# Patient Record
Sex: MALE | Race: OTHER | HISPANIC OR LATINO | Employment: PART TIME | URBAN - METROPOLITAN AREA
[De-identification: names, ages, dates, MRNs, and addresses within clinical notes are randomized per-mention and may not be internally consistent; named-entity substitution may affect disease eponyms.]

---

## 2023-09-12 ENCOUNTER — APPOINTMENT (OUTPATIENT)
Dept: LAB | Facility: LAB | Age: 23
End: 2023-09-12
Payer: MEDICAID

## 2023-09-12 ENCOUNTER — OFFICE VISIT (OUTPATIENT)
Dept: PRIMARY CARE | Facility: CLINIC | Age: 23
End: 2023-09-12
Payer: MEDICAID

## 2023-09-12 VITALS — DIASTOLIC BLOOD PRESSURE: 50 MMHG | SYSTOLIC BLOOD PRESSURE: 100 MMHG | WEIGHT: 211 LBS

## 2023-09-12 DIAGNOSIS — Z00.00 HEALTHCARE MAINTENANCE: Primary | ICD-10-CM

## 2023-09-12 PROCEDURE — 99385 PREV VISIT NEW AGE 18-39: CPT | Performed by: STUDENT IN AN ORGANIZED HEALTH CARE EDUCATION/TRAINING PROGRAM

## 2023-09-12 PROCEDURE — 1036F TOBACCO NON-USER: CPT | Performed by: STUDENT IN AN ORGANIZED HEALTH CARE EDUCATION/TRAINING PROGRAM

## 2023-09-12 RX ORDER — LAMOTRIGINE 150 MG/1
TABLET ORAL
COMMUNITY
Start: 2023-06-30 | End: 2023-09-12 | Stop reason: ALTCHOICE

## 2023-09-12 RX ORDER — ARIPIPRAZOLE 5 MG/1
5 TABLET ORAL DAILY
COMMUNITY
Start: 2023-02-24 | End: 2023-09-12 | Stop reason: ALTCHOICE

## 2023-09-12 RX ORDER — BUPROPION HYDROCHLORIDE 300 MG/1
TABLET ORAL
COMMUNITY
Start: 2023-08-09 | End: 2024-02-14 | Stop reason: SDUPTHER

## 2023-09-12 RX ORDER — ARIPIPRAZOLE 2 MG/1
TABLET ORAL
COMMUNITY
Start: 2023-02-13 | End: 2023-09-12 | Stop reason: ALTCHOICE

## 2023-09-12 RX ORDER — LAMOTRIGINE 100 MG/1
1 TABLET ORAL DAILY
COMMUNITY
Start: 2023-02-13 | End: 2023-09-12 | Stop reason: ALTCHOICE

## 2023-09-12 RX ORDER — LAMOTRIGINE 25 MG/1
TABLET ORAL
COMMUNITY
Start: 2023-08-24 | End: 2023-09-12 | Stop reason: ALTCHOICE

## 2023-09-12 RX ORDER — ESCITALOPRAM OXALATE 10 MG/1
TABLET ORAL
COMMUNITY
Start: 2023-08-24 | End: 2024-01-17 | Stop reason: SDUPTHER

## 2023-09-12 RX ORDER — BUPROPION HYDROCHLORIDE 150 MG/1
TABLET ORAL
COMMUNITY
Start: 2023-06-30 | End: 2023-11-21 | Stop reason: ALTCHOICE

## 2023-09-12 RX ORDER — PANTOPRAZOLE SODIUM 40 MG/1
TABLET, DELAYED RELEASE ORAL
COMMUNITY
Start: 2023-08-12 | End: 2023-11-13 | Stop reason: SDUPTHER

## 2023-09-12 NOTE — PROGRESS NOTES
Subjective   Luciano Tejeda is a 23 y.o. male who presents for [facility].  HPI  Patient presents to [facility] care with a primary care provider.  Patient states that he recently moved up in December 2022 from [place].  At that time, he was diagnosed with bipolar disorder in [place].  He now follows with psychiatry at [facility] and was diagnosed with depression.  Patient states that he is sleeping approximately 6 to 7 hours at night and wakes up feeling refreshed.  He occasionally goes back to sleep for approximately 1 hour and wakes up tired.  He is taking his medications and overall believes they are helping.  The mom accompanies the patient to the appointment and voices concerns regarding irritability.    Past medical history: Depression  Surgical history: pinky surgery  Family history: bipolar, Hodgkin lymphoma, alcohol abuse, hypertension, diabetes  Social history: alcohol use on weekends, no tobacco use, smokes marijuana 3 times weekly, works at [employer], planning to return to school, wants to be a vet, moved up from [place] 12/2022    Review of Systems  10 point review of system was performed and negative except as stated above.    Objective   /50   Wt 95.7 kg (211 lb)    Physical Exam  Physical Exam:  General:  Pleasant and cooperative. No apparent distress.  HEENT:  Normocephalic, atraumatic, mucus membranes moist.   Neck:  Trachea midline.  No JVD.    Chest:  Clear to auscultation bilaterally. No wheezes, rales, or rhonchi.  CV:  Regular rate and rhythm.  Positive S1/S2.   Abdomen: Bowel sounds present in all four quadrants, abdomen is soft, non-tender, non-distended.  Extremities:  No lower extremity edema or cyanosis.   Neurological:  AAOx3. No focal deficits.  Skin:  Warm and dry.    Assessment/Plan   Problem List Items Addressed This Visit    None    Depression vs bipolar   -Diagnosed with bipolar in [place] and Depression in [place]  -Currently on Wellbutrin 450  mg daily and Lexapro 10 mg daily  -Follows with Dr. Nicholson - [facility] psychiatry    Health maintenance:  -Routine labs prior to next visit    RTC in 1 year or sooner as needed.    This note was dictated by speech recognition. Minor errors in transcription may be present.

## 2023-10-05 ENCOUNTER — LAB (OUTPATIENT)
Dept: LAB | Facility: LAB | Age: 23
End: 2023-10-05
Payer: MEDICAID

## 2023-10-05 DIAGNOSIS — Z00.00 HEALTHCARE MAINTENANCE: ICD-10-CM

## 2023-10-05 LAB
ALBUMIN SERPL BCP-MCNC: 4.5 G/DL (ref 3.4–5)
ALP SERPL-CCNC: 60 U/L (ref 33–120)
ALT SERPL W P-5'-P-CCNC: 19 U/L (ref 10–52)
ANION GAP SERPL CALC-SCNC: 11 MMOL/L (ref 10–20)
AST SERPL W P-5'-P-CCNC: 15 U/L (ref 9–39)
BILIRUB SERPL-MCNC: 0.4 MG/DL (ref 0–1.2)
BUN SERPL-MCNC: 17 MG/DL (ref 6–23)
CALCIUM SERPL-MCNC: 9.7 MG/DL (ref 8.6–10.6)
CHLORIDE SERPL-SCNC: 103 MMOL/L (ref 98–107)
CHOLEST SERPL-MCNC: 150 MG/DL (ref 0–199)
CHOLESTEROL/HDL RATIO: 2.7
CO2 SERPL-SCNC: 30 MMOL/L (ref 21–32)
CREAT SERPL-MCNC: 0.84 MG/DL (ref 0.5–1.3)
ERYTHROCYTE [DISTWIDTH] IN BLOOD BY AUTOMATED COUNT: 11.6 % (ref 11.5–14.5)
GFR SERPL CREATININE-BSD FRML MDRD: >90 ML/MIN/1.73M*2
GLUCOSE SERPL-MCNC: 91 MG/DL (ref 74–99)
HCT VFR BLD AUTO: 48.1 % (ref 41–52)
HDLC SERPL-MCNC: 56.5 MG/DL
HGB BLD-MCNC: 15.9 G/DL (ref 13.5–17.5)
LDLC SERPL CALC-MCNC: 61 MG/DL (ref 110–150)
MCH RBC QN AUTO: 30.9 PG (ref 26–34)
MCHC RBC AUTO-ENTMCNC: 33.1 G/DL (ref 32–36)
MCV RBC AUTO: 93 FL (ref 80–100)
NON HDL CHOLESTEROL: 94 MG/DL (ref 0–149)
NRBC BLD-RTO: 0 /100 WBCS (ref 0–0)
PLATELET # BLD AUTO: 208 X10*3/UL (ref 150–450)
PMV BLD AUTO: 11.1 FL (ref 7.5–11.5)
POTASSIUM SERPL-SCNC: 4.1 MMOL/L (ref 3.5–5.3)
PROT SERPL-MCNC: 7.2 G/DL (ref 6.4–8.2)
RBC # BLD AUTO: 5.15 X10*6/UL (ref 4.5–5.9)
SODIUM SERPL-SCNC: 140 MMOL/L (ref 136–145)
TRIGL SERPL-MCNC: 163 MG/DL (ref 0–149)
TSH SERPL-ACNC: 0.81 MIU/L (ref 0.44–3.98)
VLDL: 33 MG/DL (ref 0–40)
WBC # BLD AUTO: 6.7 X10*3/UL (ref 4.4–11.3)

## 2023-10-05 PROCEDURE — 80061 LIPID PANEL: CPT

## 2023-10-05 PROCEDURE — 84443 ASSAY THYROID STIM HORMONE: CPT

## 2023-10-05 PROCEDURE — 36415 COLL VENOUS BLD VENIPUNCTURE: CPT

## 2023-10-05 PROCEDURE — 80053 COMPREHEN METABOLIC PANEL: CPT

## 2023-10-05 PROCEDURE — 85027 COMPLETE CBC AUTOMATED: CPT

## 2023-11-03 ENCOUNTER — LAB (OUTPATIENT)
Dept: LAB | Facility: LAB | Age: 23
End: 2023-11-03
Payer: MEDICAID

## 2023-11-03 ENCOUNTER — TELEPHONE (OUTPATIENT)
Dept: PRIMARY CARE | Facility: CLINIC | Age: 23
End: 2023-11-03

## 2023-11-03 ENCOUNTER — OFFICE VISIT (OUTPATIENT)
Dept: PRIMARY CARE | Facility: CLINIC | Age: 23
End: 2023-11-03
Payer: MEDICAID

## 2023-11-03 VITALS — SYSTOLIC BLOOD PRESSURE: 102 MMHG | WEIGHT: 227 LBS | DIASTOLIC BLOOD PRESSURE: 62 MMHG

## 2023-11-03 DIAGNOSIS — G47.00 INSOMNIA, UNSPECIFIED TYPE: ICD-10-CM

## 2023-11-03 DIAGNOSIS — G47.00 INSOMNIA, UNSPECIFIED TYPE: Primary | ICD-10-CM

## 2023-11-03 DIAGNOSIS — L74.9 SWEATING ABNORMALITY: ICD-10-CM

## 2023-11-03 LAB
25(OH)D3 SERPL-MCNC: 24 NG/ML (ref 30–100)
BASOPHILS # BLD AUTO: 0.03 X10*3/UL (ref 0–0.1)
BASOPHILS NFR BLD AUTO: 0.5 %
EOSINOPHIL # BLD AUTO: 0.14 X10*3/UL (ref 0–0.7)
EOSINOPHIL NFR BLD AUTO: 2.2 %
ERYTHROCYTE [DISTWIDTH] IN BLOOD BY AUTOMATED COUNT: 11.8 % (ref 11.5–14.5)
HCT VFR BLD AUTO: 49 % (ref 41–52)
HGB BLD-MCNC: 15.7 G/DL (ref 13.5–17.5)
IMM GRANULOCYTES # BLD AUTO: 0.02 X10*3/UL (ref 0–0.7)
IMM GRANULOCYTES NFR BLD AUTO: 0.3 % (ref 0–0.9)
IRON SATN MFR SERPL: 30 % (ref 25–45)
IRON SERPL-MCNC: 107 UG/DL (ref 35–150)
LYMPHOCYTES # BLD AUTO: 1.49 X10*3/UL (ref 1.2–4.8)
LYMPHOCYTES NFR BLD AUTO: 23 %
MCH RBC QN AUTO: 30.6 PG (ref 26–34)
MCHC RBC AUTO-ENTMCNC: 32 G/DL (ref 32–36)
MCV RBC AUTO: 96 FL (ref 80–100)
MONOCYTES # BLD AUTO: 0.73 X10*3/UL (ref 0.1–1)
MONOCYTES NFR BLD AUTO: 11.3 %
NEUTROPHILS # BLD AUTO: 4.07 X10*3/UL (ref 1.2–7.7)
NEUTROPHILS NFR BLD AUTO: 62.7 %
NRBC BLD-RTO: 0 /100 WBCS (ref 0–0)
PLATELET # BLD AUTO: 207 X10*3/UL (ref 150–450)
RBC # BLD AUTO: 5.13 X10*6/UL (ref 4.5–5.9)
TIBC SERPL-MCNC: 354 UG/DL (ref 240–445)
UIBC SERPL-MCNC: 247 UG/DL (ref 110–370)
VIT B12 SERPL-MCNC: 572 PG/ML (ref 211–911)
WBC # BLD AUTO: 6.5 X10*3/UL (ref 4.4–11.3)

## 2023-11-03 PROCEDURE — 82607 VITAMIN B-12: CPT

## 2023-11-03 PROCEDURE — 86038 ANTINUCLEAR ANTIBODIES: CPT

## 2023-11-03 PROCEDURE — 83550 IRON BINDING TEST: CPT

## 2023-11-03 PROCEDURE — 82306 VITAMIN D 25 HYDROXY: CPT

## 2023-11-03 PROCEDURE — 36415 COLL VENOUS BLD VENIPUNCTURE: CPT

## 2023-11-03 PROCEDURE — 99213 OFFICE O/P EST LOW 20 MIN: CPT | Performed by: STUDENT IN AN ORGANIZED HEALTH CARE EDUCATION/TRAINING PROGRAM

## 2023-11-03 PROCEDURE — 1036F TOBACCO NON-USER: CPT | Performed by: STUDENT IN AN ORGANIZED HEALTH CARE EDUCATION/TRAINING PROGRAM

## 2023-11-03 PROCEDURE — 83540 ASSAY OF IRON: CPT

## 2023-11-03 PROCEDURE — 86225 DNA ANTIBODY NATIVE: CPT

## 2023-11-03 PROCEDURE — 85025 COMPLETE CBC W/AUTO DIFF WBC: CPT

## 2023-11-03 PROCEDURE — 86235 NUCLEAR ANTIGEN ANTIBODY: CPT

## 2023-11-03 NOTE — TELEPHONE ENCOUNTER
Feliciano saw Dr Barron today for insomnia, she referred him for a 2nd opinion for psychiatry, Mom wants to know if you have any names to recommend

## 2023-11-03 NOTE — PROGRESS NOTES
Subjective   Patient ID: Luciano Tejeda is a 23 y.o. male who presents for Night Sweats (1xweek).  HPI  Feliciano presents for trouble sleeping. He reports insomnia for the past couple of weeks. He tried taking melatonin initially, with some improvement, but then he started developing sweating during the night. No recent headaches, energy levels have been low, not typically getting restful sleep. He also reports getting episodes of feeling warm during the day. Patient with a history of cluster Headaches in high school. No recent headaches. He has been having more loose stools, especially after eating spicy foods. Denies CP, SOB, N/V or any other complaints. He has been gaining weight recently, less active physically. He feels frustrated by agitation and irritability that he feels he cannot control at times.    Review of Systems  12-point ROS was reviewed and is negative, unless otherwise noted in HPI    Objective   Vitals:    11/03/23 1246   BP: 102/62      Physical Exam  GEN: alert, conversant, NAD  HEENT: PERRL, EOMI, MMM  NECK: supple, no LAD appreciated  CHEST: CTAB  CV: S1, S2, RRR, no murmurs appreciated  ABD: soft, NT, ND  EXT: no significant LE edema  SKIN: warm, dry    Assessment/Plan   #insomnia  #sweating  #irritability  #depression vs bipolar  Plan:  - Continue melatonin nightly, advised good sleep hygiene practices, keeping bedroom cool  - Check CBC w/ diff, MERRICK, iron studies, B12, vit D levels  - Referral to psychiatry for second opinion on medication management, currently following with [facility] psychiatry    RTC as scheduled, sooner PRN     Jj Barron DO

## 2023-11-06 LAB
ANA PATTERN: ABNORMAL
ANA SER QL HEP2 SUBST: POSITIVE
ANA TITR SER IF: ABNORMAL {TITER}
CENTROMERE B AB SER-ACNC: <0.2 AI
CHROMATIN AB SERPL-ACNC: <0.2 AI
DSDNA AB SER-ACNC: 1 IU/ML
ENA JO1 AB SER QL IA: <0.2 AI
ENA RNP AB SER IA-ACNC: <0.2 AI
ENA SCL70 AB SER QL IA: <0.2 AI
ENA SM AB SER IA-ACNC: <0.2 AI
ENA SM+RNP AB SER QL IA: <0.2 AI
ENA SS-A AB SER IA-ACNC: <0.2 AI
ENA SS-B AB SER IA-ACNC: <0.2 AI
RIBOSOMAL P AB SER-ACNC: <0.2 AI

## 2023-11-13 DIAGNOSIS — K21.9 GASTROESOPHAGEAL REFLUX DISEASE, UNSPECIFIED WHETHER ESOPHAGITIS PRESENT: Primary | ICD-10-CM

## 2023-11-13 RX ORDER — PANTOPRAZOLE SODIUM 40 MG/1
40 TABLET, DELAYED RELEASE ORAL
Qty: 30 TABLET | Refills: 5 | Status: SHIPPED | OUTPATIENT
Start: 2023-11-13 | End: 2024-05-23

## 2023-11-15 ENCOUNTER — OFFICE VISIT (OUTPATIENT)
Dept: BEHAVIORAL HEALTH | Facility: CLINIC | Age: 23
End: 2023-11-15
Payer: MEDICAID

## 2023-11-15 DIAGNOSIS — F19.10 SUBSTANCE ABUSE (MULTI): ICD-10-CM

## 2023-11-15 DIAGNOSIS — F31.81 BIPOLAR 2 DISORDER (MULTI): ICD-10-CM

## 2023-11-15 DIAGNOSIS — F43.23 ADJUSTMENT DISORDER WITH MIXED ANXIETY AND DEPRESSED MOOD: ICD-10-CM

## 2023-11-15 DIAGNOSIS — F41.9 ANXIETY: ICD-10-CM

## 2023-11-15 PROCEDURE — 99204 OFFICE O/P NEW MOD 45 MIN: CPT | Performed by: PSYCHIATRY & NEUROLOGY

## 2023-11-15 PROCEDURE — 1036F TOBACCO NON-USER: CPT | Performed by: PSYCHIATRY & NEUROLOGY
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
## 2023-11-21 ENCOUNTER — OFFICE VISIT (OUTPATIENT)
Dept: PRIMARY CARE | Facility: CLINIC | Age: 23
End: 2023-11-21
Payer: MEDICAID

## 2023-11-21 VITALS — WEIGHT: 223 LBS | SYSTOLIC BLOOD PRESSURE: 103 MMHG | DIASTOLIC BLOOD PRESSURE: 65 MMHG

## 2023-11-21 DIAGNOSIS — Z00.00 HEALTHCARE MAINTENANCE: Primary | ICD-10-CM

## 2023-11-21 PROCEDURE — 99395 PREV VISIT EST AGE 18-39: CPT | Performed by: STUDENT IN AN ORGANIZED HEALTH CARE EDUCATION/TRAINING PROGRAM

## 2023-11-21 PROCEDURE — 1036F TOBACCO NON-USER: CPT | Performed by: STUDENT IN AN ORGANIZED HEALTH CARE EDUCATION/TRAINING PROGRAM

## 2023-11-21 RX ORDER — ESCITALOPRAM OXALATE 20 MG/1
20 TABLET ORAL DAILY
COMMUNITY
Start: 2023-09-19 | End: 2023-11-21 | Stop reason: ALTCHOICE

## 2023-11-21 NOTE — PROGRESS NOTES
Annual exam    Subjective   Patient ID: Luciano Tejeda is a 23 y.o. male who presents for Annual Exam.    HPI     Presents for annual exam.  Reports has been doing well.  Has not had any issues at work.  Does note that he has occasionally been 5 to 7 minutes late which she has been working to improve.  Continues to do well in online classes, grades have been 90s or above.  Sleep has been doing better, did have 1 occurrence that stayed up later in the morning playing video games up but otherwise has been doing well.    Review of Systems    8 point review of systems is otherwise negative unless mentioned on HPI      Objective   /65   Wt 101 kg (223 lb)     Physical Exam    General: No acute distress  HEENT: EOMI  CV: Regular rate and rhythm, normal S1 and S2, no murmurs  Pulm: Clear to auscultation bilaterally, no wheezings, rales or rhonchi  Abd: Nondistended  MSK: 5/5 strength in all extremities  Skin: No rashes   Lymphatic: No lymphadenopathy      Assessment/Plan       Depression vs bipolar   -Diagnosed with bipolar in [place] and Depression in [place]  -Currently on Wellbutrin 450 mg daily and Lexapro 10 mg daily  -Follows with psychiatry      Health maintenance:  -Routine labs that were done recently reviewed     RTC in 1 year or sooner as needed.     This note was dictated by speech recognition. Minor errors in transcription may be present.

## 2023-11-22 ENCOUNTER — OFFICE VISIT (OUTPATIENT)
Dept: BEHAVIORAL HEALTH | Facility: CLINIC | Age: 23
End: 2023-11-22
Payer: MEDICAID

## 2023-11-22 DIAGNOSIS — F90.2 ATTENTION DEFICIT HYPERACTIVITY DISORDER (ADHD), COMBINED TYPE: ICD-10-CM

## 2023-11-22 DIAGNOSIS — F41.9 ANXIETY: ICD-10-CM

## 2023-11-22 DIAGNOSIS — F31.81 BIPOLAR 2 DISORDER (MULTI): ICD-10-CM

## 2023-11-22 DIAGNOSIS — F43.23 ADJUSTMENT DISORDER WITH MIXED ANXIETY AND DEPRESSED MOOD: ICD-10-CM

## 2023-11-22 DIAGNOSIS — F19.10 SUBSTANCE ABUSE (MULTI): ICD-10-CM

## 2023-11-22 PROCEDURE — 99214 OFFICE O/P EST MOD 30 MIN: CPT | Performed by: PSYCHIATRY & NEUROLOGY

## 2023-11-22 PROCEDURE — 1036F TOBACCO NON-USER: CPT | Performed by: PSYCHIATRY & NEUROLOGY

## 2023-11-22 PROCEDURE — 90836 PSYTX W PT W E/M 45 MIN: CPT | Performed by: PSYCHIATRY & NEUROLOGY

## 2023-11-22 NOTE — PROGRESS NOTES
Outpatient Psychiatry - Followup Medication+Psychotherapy    Location of service:  __x_ Office ___ A/V ___Telephone (3 factor ID completed)    Subjective   Luciano Tejeda, a 23 y.o. male, for followup visit.      Assessment/Plan   Diagnosis:    There is no problem list on file for this patient.      Diagnoses of Attention for Current Visit:  Bipolar 2 disorder  Anxiety  Substance abuse (alcohol, Marijuana)  R/o ADHD    Treatment Goals:  Specify outcomes written in observable, behavioral terms:   Anxiety: acquiring relapse prevention skills, reducing negative automatic thoughts, and reducing physical symptoms of anxiety  Depression: increasing motivation, reducing fatigue, and reducing negative automatic thoughts  Drug & Alcohol: Reducing excessive use of alcohol and marijuana  ADHD:  Further assessment for confirmation/psychological testing    Treatment Plan/Recommendations: Continue current medications for now; complete psychological testing before considering further neurologic evaluation; engage in psychotherapy; reduce to eliminate use of alcohol and marijuana; promote independence and consideration of adult life  Follow-up plan was discussed with patient.    Referral to:  [facility]    Review with patient: Treatment plan reviewed with the patient.    HPI:  Continuing review of history, past diagnoses and medications, responses.  His major issue is conflict with his mother who he feels is overly controlling and infantilizes him, but cannot afford to move out on his own (and continue drinking and buying weed when he wants)  Not considering alternative employment although had an approach by someone from [employer]; may pursue.  Tends to drink when not getting high or vice versa, and to drink on days he does not work.  Admits to using weed especially when shira.  Reviewed features of ADHD and positive experience with single dose of Adderal; also aypical response to Cocaine experimentation (not agitated or  "elevated, felt calm and in control)  Admits only taking Wellbutrin 300 mg, not 450 mg, and his meds for GERD most days; sometimes takes Lexapro 10 mg, not 20mg    Current Medications:    Current Outpatient Medications:     buPROPion XL (Wellbutrin XL) 300 mg 24 hr tablet, , Disp: , Rfl:     cetirizine (ZYRTEC) 10 mg capsule, Take by mouth., Disp: , Rfl:     escitalopram (Lexapro) 10 mg tablet, TAKE 1/2 TABLET BY MOUTH ONCE DAILY FOR 7 DAYS THEN INCREASE TO 1 FULL TABLET ONCE DAILY, Disp: , Rfl:     pantoprazole (ProtoNix) 40 mg EC tablet, Take 1 tablet (40 mg) by mouth once daily in the morning. Take before meals. Do not crush, chew, or split., Disp: 30 tablet, Rfl: 5    Interim Medical History and Review of Systems:  Noncontributory    Record Review: moderate    Psychiatric Review Of Systems:   Depressive Symptoms: NA  Manic Symptoms: Mood slightly elevated  Anxiety Symptoms: minimal  Disordered Eating Symptoms: NA  Inattentive Symptoms: mild  Hyperactive/Impulsive Symptoms: Frequent  Trauma Symptoms: NA  Conduct Issues: NA  Psychotic Symptoms: NA  Developmental Concerns: NA  Other Symptoms/Concerns:  Alcohol use; maybe 175 ml daily; has been more in past.  Marijuana use daily.  Combined use when not at work  Delirium/Altered Mental Status Symptoms: NA       Objective   Mental Status Exam:     Patient is a well nourished, well developed white male appearing to be approximately stated age  Appearance:   Well groomed, appropriately dressed   Attention:  Oriented X 3  Speech:         Form:  fluent, coherent, tangential;       Process:  mildly concrete; no abnormal associations       Content:  without hallucinations, delusions,  denies suicidal or homicidal ideation  Movements: No apparent gait disturbance or abnormal involuntary movements of face or trunk  Mood:  \"Good\"  Affect:  Euthymic  Cognition:   Grossly intact and appropriate to level of educational attainment  Judgement:   Fair  Insight:   " "Poor      Vitals:  There were no vitals filed for this visit.    Summary of Psychotherapy Component:  See HPI for details    Psychoeducation/Therapeutic Interventions during this visit:   _x__ Education re: symptoms, diagnosis and treatment options   __x_ Discussed physiologic factors that impact symptoms and stress   __x_ Discussed patterns of behavior and coping style that contribute to symptoms and presented alternative options   ___ Identified cognitive distortions and presented alternative options   __x_ Reinforced boundaries of control and responsibility in symptom management   __x_ Education re: impact of alcohol/drugs on symptoms and discussed treatment resources/rationale   ___ Explored, identified, reinforced strategies and resources for coping with symptoms    __x_ Discussed medication options, risks, benefits with patient and/or family/guardian     Psychotherapeutic approach:  _x__Individual   ___Couples   ___Group    __x_Supportive        (X)_Insight Oriented       ___Cognitive Behavioral       ___Other:    Other relevant content:  (see HPI for relevant details)    Time spent in therapy 45\"    Total time spent 60\"    Constantine Nj MD MPH  "

## 2023-12-06 ENCOUNTER — OFFICE VISIT (OUTPATIENT)
Dept: BEHAVIORAL HEALTH | Facility: CLINIC | Age: 23
End: 2023-12-06
Payer: MEDICAID

## 2023-12-06 DIAGNOSIS — F90.2 ATTENTION DEFICIT HYPERACTIVITY DISORDER (ADHD), COMBINED TYPE: ICD-10-CM

## 2023-12-06 DIAGNOSIS — F41.9 ANXIETY: ICD-10-CM

## 2023-12-06 DIAGNOSIS — F31.81 BIPOLAR 2 DISORDER (MULTI): ICD-10-CM

## 2023-12-06 DIAGNOSIS — F19.10 SUBSTANCE ABUSE (MULTI): ICD-10-CM

## 2023-12-06 PROCEDURE — 1036F TOBACCO NON-USER: CPT | Performed by: PSYCHIATRY & NEUROLOGY

## 2023-12-06 PROCEDURE — 99214 OFFICE O/P EST MOD 30 MIN: CPT | Performed by: PSYCHIATRY & NEUROLOGY

## 2023-12-06 RX ORDER — LITHIUM CARBONATE 300 MG
300 TABLET ORAL 2 TIMES DAILY
Qty: 60 TABLET | Refills: 11 | Status: SHIPPED | OUTPATIENT
Start: 2023-12-06 | End: 2023-12-27

## 2023-12-06 NOTE — PROGRESS NOTES
"Outpatient Psychiatry - Followup Medication+Psychotherapy    Location of service:  __X_ Office ___ A/V ___Telephone (3 factor ID completed)    Subjective   Luciano Tejeda, a 23 y.o. male, for followup visit.      Assessment/Plan   Diagnosis:    There is no problem list on file for this patient.      Diagnoses of Attention for Current Visit:  Problem List Items Addressed This Visit    None  Visit Diagnoses         Codes    Bipolar 2 disorder (CMS/[facility])     F31.81    Relevant Medications    lithium 300 mg tablet            Treatment Goals:  Specify outcomes written in observable, behavioral terms:   Anxiety: acquiring relapse prevention skills, reducing negative automatic thoughts, and reducing physical symptoms of anxiety  Drug & Alcohol: reducing/eliminating substance use  Bipolar II:  Reducing;/eliminating mood swings, reducing/eliminating spending sprees, gambling, irritability  R/O ADHD:  Improve concentration/focus, reduce impulsivity    Treatment Plan/Recommendations:   1. Discontinue Lexapro  2. Start Lithium carbonate 300 mg twice daily  3. Reassess in 2 weeks; based on response either increase or check blood level  4. Complete psych testing to evaluate for  diagnosis including r/o ADHD    Follow-up plan was discussed with patient.    Referral to:  Psych testing    Review with patient: Treatment plan reviewed with the patient.  Medication risks/benefit reviewed with the patient      HPI:    Patient returns for followup; Mom asks to join to report  1..Has not smoked MJ since starting with me but is still \"vaping something\"  2. Has been chronically late for work; if he misses again he will likely lose his job  3. Spends everything he earns even though not paying rent, utilities etc; purchased $200 worth of \"funko pops\" (anime character models?) for \"investment purposes\" but never sells, bought a new computer game for $50 he only played once, etc.  4. Stays up all night \"shira\" on line then has no energy next " "day  5. Seems like  he was better on Aripiprazole but kept telling doc he needed higher dose; at 12.5 mg he was \"a Zombie the next day\" so they  stopped it.  6. It was a \"Dr Manley\" in [place] who said he was possibly bipolar    In remaining 10\" patient expresses irritation with mother, \"See I told you she'd take up all our time,\" can't explaiin why even though he hated the computer game he plans to put more money into enhancements; clarifies what \"funko pops\" are and why he thinks good investment, etc.  Says his autistic brother is \"wise\" and has advised him when mother talks, just listen or don't listen but don't react, it only makes it worse.    Reviewed prior meds/responses:    No benefit he has seen from Lexapro (added just in August by [facility] doctor)  Never on Lithium, Depakote, Tegretol, other traditional mood stabilizers; was on Lamictal but didn't do much  3    Can't recall other antipsychotics beyond aripiprazole but \"might have\" been on Quetiapine, Olanzapine??    Current Medications:    Current Outpatient Medications:     buPROPion XL (Wellbutrin XL) 300 mg 24 hr tablet, , Disp: , Rfl:     cetirizine (ZYRTEC) 10 mg capsule, Take by mouth., Disp: , Rfl:     escitalopram (Lexapro) 10 mg tablet, TAKE 1/2 TABLET BY MOUTH ONCE DAILY FOR 7 DAYS THEN INCREASE TO 1 FULL TABLET ONCE DAILY, Disp: , Rfl:     lithium 300 mg tablet, Take 1 tablet (300 mg) by mouth 2 times a day., Disp: 60 tablet, Rfl: 11    pantoprazole (ProtoNix) 40 mg EC tablet, Take 1 tablet (40 mg) by mouth once daily in the morning. Take before meals. Do not crush, chew, or split., Disp: 30 tablet, Rfl: 5    Interim Medical History and Review of Systems:  Noncontributory    Record Review: moderate    Psychiatric Review Of Systems:   Depressive Symptoms: NA  Manic Symptoms: Irritability, spending sprees, gambling history  Anxiety Symptoms: Minimal  Disordered Eating Symptoms: NA  Inattentive Symptoms: Constantly changing focus before " "finishing tasks; can't concentrate on reading, other tasks (but can do shira for hours)   Hyperactive/Impulsive Symptoms: See abpve  Trauma Symptoms: NA  Conduct Issues: NA  Psychotic Symptoms: NA  Developmental Concerns: NA  Other Symptoms/Concerns:NA  Delirium/Altered Mental Status Symptoms: NA       Objective   Mental Status Exam:     Patient is a well nourished, well developed young adult white male appearing to be approximately stated age  Appearance:   Well groomed, appropriately dressed   Attention:  Oriented X 3  Speech:         Form:  fluent, coherent, mildly pressured, no evidence for formal thought disorder       Process:  abstract; but with tangential associations       Content:  without hallucinations, delusions,  denies suicidal or homicidal ideation  Movements: No apparent gait disturbance or abnormal involuntary movements of face or trunk  Mood:  \"Good\"  Affect:  Euthymic/mildly elevated  Cognition:   Grossly intact and appropriate to level of educational attainment  Judgement:   Good  Insight:   Good       Vitals:  There were no vitals filed for this visit.    Summary of Psychotherapy Component:      Psychoeducation/Therapeutic Interventions during this visit:   __X_ Education re: symptoms, diagnosis and treatment options   __x_ Discussed physiologic factors that impact symptoms and stress   __X_ Discussed patterns of behavior and coping style that contribute to symptoms and presented alternative options   __X_ Identified cognitive distortions and presented alternative options   __X_ Reinforced boundaries of control and responsibility in symptom management   __X_ Education re: impact of alcohol/drugs on symptoms and discussed treatment resources/rationale   ___ Explored, identified, reinforced strategies and resources for coping with symptoms    __X_ Discussed medication options, risks, benefits with patient and/or family/guardian     Psychotherapeutic " "approach:  __X_Individual   ___Couples   ___Group    __X_Supportive        ___Insight Oriented       ___Cognitive Behavioral       ___Other:    Other relevant content:  (see HPI for relevant details)    Time spent in therapy 20\"    Total time spent 30\"    Constantine Nj MD MPH  "

## 2023-12-21 ENCOUNTER — TELEMEDICINE (OUTPATIENT)
Dept: BEHAVIORAL HEALTH | Facility: CLINIC | Age: 23
End: 2023-12-21
Payer: MEDICAID

## 2023-12-21 DIAGNOSIS — F41.9 ANXIETY: ICD-10-CM

## 2023-12-21 DIAGNOSIS — F31.81 BIPOLAR 2 DISORDER (MULTI): ICD-10-CM

## 2023-12-21 DIAGNOSIS — F19.10 SUBSTANCE ABUSE (MULTI): ICD-10-CM

## 2023-12-21 PROCEDURE — 90791 PSYCH DIAGNOSTIC EVALUATION: CPT | Performed by: PSYCHOLOGIST
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
## 2023-12-26 ENCOUNTER — TELEMEDICINE (OUTPATIENT)
Dept: BEHAVIORAL HEALTH | Facility: CLINIC | Age: 23
End: 2023-12-26
Payer: MEDICAID

## 2023-12-26 DIAGNOSIS — F19.10 SUBSTANCE ABUSE (MULTI): ICD-10-CM

## 2023-12-26 DIAGNOSIS — F31.81 BIPOLAR 2 DISORDER (MULTI): ICD-10-CM

## 2023-12-26 DIAGNOSIS — F41.9 ANXIETY: ICD-10-CM

## 2023-12-26 PROBLEM — F90.2 ATTENTION DEFICIT HYPERACTIVITY DISORDER (ADHD), COMBINED TYPE: Status: RESOLVED | Noted: 2023-12-06 | Resolved: 2023-12-26

## 2023-12-26 PROCEDURE — 90837 PSYTX W PT 60 MINUTES: CPT | Performed by: PSYCHOLOGIST

## 2023-12-26 NOTE — PROGRESS NOTES
10:05am to 11:00am  Met with client today for his individual session via telehealth.  Client was given an ADHD specific assessment interview.  Upon completion of the assessment client's interview was scored and client does NOT have ADHD.  Client will be given his diagnosis during the next session.  It should be noted that client's old diagnosis of ADHD has been removed from client's chart.

## 2023-12-27 ENCOUNTER — OFFICE VISIT (OUTPATIENT)
Dept: BEHAVIORAL HEALTH | Facility: CLINIC | Age: 23
End: 2023-12-27
Payer: MEDICAID

## 2023-12-27 DIAGNOSIS — F31.81 BIPOLAR 2 DISORDER (MULTI): ICD-10-CM

## 2023-12-27 DIAGNOSIS — F43.23 ADJUSTMENT DISORDER WITH MIXED ANXIETY AND DEPRESSED MOOD: ICD-10-CM

## 2023-12-27 DIAGNOSIS — F41.9 ANXIETY: ICD-10-CM

## 2023-12-27 DIAGNOSIS — F19.10 SUBSTANCE ABUSE (MULTI): ICD-10-CM

## 2023-12-27 PROCEDURE — 99215 OFFICE O/P EST HI 40 MIN: CPT | Performed by: PSYCHIATRY & NEUROLOGY

## 2023-12-27 PROCEDURE — 99417 PROLNG OP E/M EACH 15 MIN: CPT | Performed by: PSYCHIATRY & NEUROLOGY

## 2023-12-27 PROCEDURE — 1036F TOBACCO NON-USER: CPT | Performed by: PSYCHIATRY & NEUROLOGY

## 2023-12-27 NOTE — PROGRESS NOTES
"Outpatient Psychiatry - Followup Medication+Psychotherapy     Location of service:  __X_ Office       ___ A/V            ___Telephone (3 factor ID completed)        Subjective   Luciano Tejeda, a 23 y.o. male, for followup visit.              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)     F31.81     Relevant Medications     lithium 300 mg tablet  - patient declined to take due to concerns about side effects            Treatment Goals:  Specify outcomes written in observable, behavioral terms:   Anxiety: acquiring relapse prevention skills, reducing negative automatic thoughts, and reducing physical symptoms of anxiety  Drug & Alcohol: reducing/eliminating substance use  Bipolar II:  Reducing;/eliminating mood swings, reducing/eliminating spending sprees, gambling, irritability  R/O ADHD:  Improve concentration/focus, reduce impulsivity - COMPLETED - no ADHD     Treatment Plan/Recommendations:   1. Discontinue Lithium (patient did not take and refuses  2. Resume Lexapro (patient did not discontinue)  3. Continue Bupropion  4. Return to clinic in 3 weeks  5. Dr. Lay to assist in finding appropriate therapist     Follow-up plan was discussed with patient.     Referral to:  Therapy (pending)     Review with patient: Treatment plan reviewed with the patient.  Medication risks/benefit reviewed with the patient        HPI:    Patient returns for followup; Mom  joins for second half to report  1..Has not smoked MJ ut is still \"vaping something\"  2. Continues to be late for work;  3. Spends everything he earns on shira and vaping  4. Stays up all night \"shira\" on line then has no energy next day  5. Was better on Aripiprazole but koverly sedated when dose increased above 10 mg  6. \"He " "needs to grow up.    Patient was assessed by Dr. Lay who finds NO SUPPORT for diagnosis of ADHD; therefore diagnosis removed from problem list.     Patient perhaps willing to try Depakote or Tegretal?  Might be willing to go back on Aripiprazole at lower dose        Current Medications:     Current Outpatient Medications:     buPROPion XL (Wellbutrin XL) 450 mg     escitalopram (Lexapro) 10 mg     pantoprazole (ProtoNix) 40 mg EC tablet, (prescribed by PCP     Interim Medical History and Review of Systems:  Noncontributory     Record Review: moderate     Psychiatric Review Of Systems:   Depressive Symptoms: NA  Manic Symptoms: Irritability, spending sprees, gambling history  Anxiety Symptoms: Minimal  Disordered Eating Symptoms: NA  Inattentive Symptoms: Constantly changing focus before finishing tasks; can't concentrate on reading, other tasks (but can do shira for hours)   Hyperactive/Impulsive Symptoms: See abpve  Trauma Symptoms: NA  Conduct Issues: NA  Psychotic Symptoms: NA  Developmental Concerns: NA  Other Symptoms/Concerns:NA  Delirium/Altered Mental Status Symptoms: NA        Objective   Mental Status Exam:  Patient is a well nourished, well developed young adult white male appearing to be approximately stated age  Appearance:   Well groomed, appropriately dressed   Attention:  Oriented X 3  Speech:         Form:  fluent, coherent, mildly pressured, no evidence for formal thought disorder       Process:  abstract; but with tangential associations       Content:  without hallucinations, delusions,  denies suicidal or homicidal ideation  Movements: No apparent gait disturbance or abnormal involuntary movements of face or trunk  Mood:  \"Good\"  Affect:  Euthymic/mildly elevated  Cognition:   Grossly intact and appropriate to level of educational attainment  Judgement:   Good  Insight:   Good         Vitals:  There were no vitals filed for this visit.     Summary of Psychotherapy Component:     " "  Psychoeducation/Therapeutic Interventions during this visit:   __X_ Education re: symptoms, diagnosis and treatment options   __x_ Discussed physiologic factors that impact symptoms and stress   __X_ Discussed patterns of behavior and coping style that contribute to symptoms and presented alternative options   __X_ Identified cognitive distortions and presented alternative options   __X_ Reinforced boundaries of control and responsibility in symptom management   __X_ Education re: impact of alcohol/drugs on symptoms and discussed treatment resources/rationale   ___ Explored, identified, reinforced strategies and resources for coping with symptoms    __X_ Discussed medication options, risks, benefits with patient and/or family/guardian      Psychotherapeutic approach:  __X_Individual                          __X_Supportive          :     Other relevant content:  (see HPI for relevant details)     Time spent in therapy 45\"     Total time spent 55\"     Constantine Nj MD MPH  "

## 2024-01-04 ENCOUNTER — APPOINTMENT (OUTPATIENT)
Dept: BEHAVIORAL HEALTH | Facility: CLINIC | Age: 24
End: 2024-01-04
Payer: MEDICAID

## 2024-01-17 ENCOUNTER — TELEPHONE (OUTPATIENT)
Dept: PRIMARY CARE | Facility: CLINIC | Age: 24
End: 2024-01-17

## 2024-01-17 ENCOUNTER — APPOINTMENT (OUTPATIENT)
Dept: BEHAVIORAL HEALTH | Facility: CLINIC | Age: 24
End: 2024-01-17
Payer: MEDICAID

## 2024-01-17 DIAGNOSIS — F31.81 BIPOLAR 2 DISORDER (MULTI): ICD-10-CM

## 2024-01-17 RX ORDER — ESCITALOPRAM OXALATE 10 MG/1
20 TABLET ORAL DAILY
Qty: 60 TABLET | Refills: 2 | Status: SHIPPED | OUTPATIENT
Start: 2024-01-17 | End: 2024-01-18

## 2024-01-17 NOTE — TELEPHONE ENCOUNTER
Needs emergency fill of lexapro until psychologist comes back from being ill.  (Not prescribed by you) ???

## 2024-01-18 ENCOUNTER — OFFICE VISIT (OUTPATIENT)
Dept: OPHTHALMOLOGY | Facility: CLINIC | Age: 24
End: 2024-01-18
Payer: MEDICAID

## 2024-01-18 DIAGNOSIS — H52.223 REGULAR ASTIGMATISM OF BOTH EYES: Primary | ICD-10-CM

## 2024-01-18 DIAGNOSIS — H52.03 HYPERMETROPIA OF BOTH EYES: ICD-10-CM

## 2024-01-18 PROCEDURE — 92015 DETERMINE REFRACTIVE STATE: CPT | Performed by: OPTOMETRIST

## 2024-01-18 PROCEDURE — 92004 COMPRE OPH EXAM NEW PT 1/>: CPT | Performed by: OPTOMETRIST

## 2024-01-18 RX ORDER — ESCITALOPRAM OXALATE 20 MG/1
TABLET ORAL
COMMUNITY
Start: 2023-10-19 | End: 2024-04-10 | Stop reason: WASHOUT

## 2024-01-18 ASSESSMENT — CUP TO DISC RATIO
OS_RATIO: 0.3
OD_RATIO: 0.3

## 2024-01-18 ASSESSMENT — ENCOUNTER SYMPTOMS
CONSTITUTIONAL NEGATIVE: 0
MUSCULOSKELETAL NEGATIVE: 0
PSYCHIATRIC NEGATIVE: 0
HEMATOLOGIC/LYMPHATIC NEGATIVE: 0
NEUROLOGICAL NEGATIVE: 0
ENDOCRINE NEGATIVE: 0
CARDIOVASCULAR NEGATIVE: 0
ALLERGIC/IMMUNOLOGIC NEGATIVE: 0
EYES NEGATIVE: 0
RESPIRATORY NEGATIVE: 0
GASTROINTESTINAL NEGATIVE: 0

## 2024-01-18 ASSESSMENT — CONF VISUAL FIELD
METHOD: COUNTING FINGERS
OD_INFERIOR_TEMPORAL_RESTRICTION: 0
OS_INFERIOR_TEMPORAL_RESTRICTION: 0
OS_SUPERIOR_TEMPORAL_RESTRICTION: 0
OD_SUPERIOR_TEMPORAL_RESTRICTION: 0
OD_SUPERIOR_NASAL_RESTRICTION: 0
OD_NORMAL: 1
OS_NORMAL: 1
OD_INFERIOR_NASAL_RESTRICTION: 0
OS_INFERIOR_NASAL_RESTRICTION: 0
OS_SUPERIOR_NASAL_RESTRICTION: 0

## 2024-01-18 ASSESSMENT — REFRACTION
OD_SPHERE: +0.25
OS_AXIS: 061
OS_CYLINDER: -0.75
OD_CYLINDER: -1.00
OS_SPHERE: +0.50
OD_AXIS: 093

## 2024-01-18 ASSESSMENT — REFRACTION_MANIFEST
OS_AXIS: 060
OD_SPHERE: PLANO
OS_CYLINDER: -0.25
OD_AXIS: 090
OS_SPHERE: -0.25
OD_CYLINDER: -1.00

## 2024-01-18 ASSESSMENT — EXTERNAL EXAM - LEFT EYE: OS_EXAM: NORMAL

## 2024-01-18 ASSESSMENT — TONOMETRY
OD_IOP_MMHG: 13
IOP_METHOD: GOLDMANN APPLANATION
OS_IOP_MMHG: 14

## 2024-01-18 ASSESSMENT — VISUAL ACUITY
METHOD: SNELLEN - LINEAR
OD_CC: 20/20
OS_CC: 20/20

## 2024-01-18 ASSESSMENT — REFRACTION_WEARINGRX
OD_AXIS: 093
OD_SPHERE: +0.25
OD_CYLINDER: -1.00
OS_AXIS: 061
OS_CYLINDER: -0.25
OS_SPHERE: +0.25

## 2024-01-18 ASSESSMENT — EXTERNAL EXAM - RIGHT EYE: OD_EXAM: NORMAL

## 2024-01-18 ASSESSMENT — SLIT LAMP EXAM - LIDS
COMMENTS: NORMAL
COMMENTS: NORMAL

## 2024-01-18 NOTE — PROGRESS NOTES
Assessment/Plan   Diagnoses and all orders for this visit:  Regular astigmatism of both eyes  Hypermetropia of both eyes  New spec rx released today per patient request. Ocular health wnl for age OU. Monitor 1 year or sooner prn. Refraction billed today. Discussed ok to wear glasses prn, but recommended to wear for driving.

## 2024-01-24 ENCOUNTER — APPOINTMENT (OUTPATIENT)
Dept: BEHAVIORAL HEALTH | Facility: CLINIC | Age: 24
End: 2024-01-24
Payer: MEDICAID

## 2024-01-31 ENCOUNTER — OFFICE VISIT (OUTPATIENT)
Dept: BEHAVIORAL HEALTH | Facility: CLINIC | Age: 24
End: 2024-01-31
Payer: MEDICAID

## 2024-01-31 DIAGNOSIS — F43.23 ADJUSTMENT DISORDER WITH MIXED ANXIETY AND DEPRESSED MOOD: ICD-10-CM

## 2024-01-31 DIAGNOSIS — F19.10 SUBSTANCE ABUSE (MULTI): ICD-10-CM

## 2024-01-31 DIAGNOSIS — F31.81 BIPOLAR 2 DISORDER (MULTI): Primary | ICD-10-CM

## 2024-01-31 PROCEDURE — 99215 OFFICE O/P EST HI 40 MIN: CPT | Performed by: PSYCHIATRY & NEUROLOGY

## 2024-01-31 PROCEDURE — 1036F TOBACCO NON-USER: CPT | Performed by: PSYCHIATRY & NEUROLOGY

## 2024-01-31 RX ORDER — DIVALPROEX SODIUM 250 MG/1
250 TABLET, DELAYED RELEASE ORAL 3 TIMES DAILY
Qty: 90 TABLET | Refills: 0 | Status: SHIPPED | OUTPATIENT
Start: 2024-01-31 | End: 2024-02-14 | Stop reason: SINTOL

## 2024-01-31 NOTE — PROGRESS NOTES
"Outpatient Psychiatry - Followup Medication+Psychotherapy     Location of service:  __X_ Office       ___ A/V            ___Telephone (3 factor ID completed)        Subjective   Luciano Tejeda, a 23 y.o. male, for followup visit.              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             Codes     Bipolar 2 disorder (CMS/[facility])     F31.81     Relevant Medications     Depakote 250 mg twice daily (prescribed today)  Reduce Bupropion from 450 mg to 300 mg daily  Continue Lexapro 20 mg daily            Treatment Goals:    Anxiety: acquiring relapse prevention skills, reducing negative automatic thoughts, and reducing physical symptoms of anxiety  Drug & Alcohol: reducing/eliminating substance use  Bipolar II:  Reducing;/eliminating mood swings, reducing/eliminating spending sprees, gambling, irritability  R/O ADHD:  Improve concentration/focus, reduce impulsivity - COMPLETED - no ADHD     Treatment Plan/Recommendations:   1. Discontinue Lithium (patient did not take and refuses  2. Resume Lexapro (patient did not discontinue)  3. Continue Bupropion  4. Return to clinic in 2 weeks  5. Dr. Lay to assist in finding appropriate therapist     Follow-up plan was discussed with patient.     Referral to:  Therapy (pending)     Review with patient: Treatment plan reviewed with the patient.  Medication risks/benefit reviewed with the patient        HPI:    Patient returns for followup; Mom  joins for second half to report  1..He says he stopped smoking for 2 weeks but then had \"one joint\" and \"Mom went ballistic\"      (She implies he did not stop  2. \"what are you going to do with your life?\"  3. Planning to return to finish college degree (he says; she is skeptical  4. Still shira every night; reports starting to get sleepy at night " "but \"pushes through\"  5. Agrees to try Depakote and get off of the antidepressants; Mom still wants him to get Aripiprazole  6. \"He needs to have a functional MRI to find out what's wrong with his head\"  (I explaiined again that there are no indications for fMRI and Medicaid would not pay; I don't see a purpose at this time  7. Still drinking             Current Medications:     Current Outpatient Medications:     buPROPion XL (Wellbutrin XL) 450 mg     escitalopram (Lexapro) 10 mg (Mom says 20?)    pantoprazole (ProtoNix) 40 mg EC tablet, (prescribed by PCP      Interim Medical History and Review of Systems:  Noncontributory     Record Review: moderate     Psychiatric Review Of Systems:   Depressive Symptoms: NA  Manic Symptoms: Irritability, spending sprees, gambling history  Anxiety Symptoms: Minimal  Disordered Eating Symptoms: NA  Inattentive Symptoms: Constantly changing focus before finishing tasks; can't concentrate on reading, other tasks (but can do shira for hours)   Hyperactive/Impulsive Symptoms: See abpve  Trauma Symptoms: NA  Conduct Issues: NA  Psychotic Symptoms: NA  Developmental Concerns: NA  Other Symptoms/Concerns:NA  Delirium/Altered Mental Status Symptoms: NA        Objective   Mental Status Exam:  Patient is a well nourished, well developed young adult white male appearing to be approximately stated age  Appearance:   Well groomed, appropriately dressed   Attention:  Oriented X 3  Speech:         Form:  fluent, coherent, mildly pressured, no evidence for formal thought disorder       Process:  abstract; but with tangential associations       Content:  without hallucinations, delusions,  denies suicidal or homicidal ideation  Movements: No apparent gait disturbance or abnormal involuntary movements of face or trunk  Mood:  \"Good\"  Affect:  Euthymic/mildly elevated until Mom joins the conversation; then becomes confrontative, agitated, raises voice  Cognition:   Grossly intact and appropriate " "to level of educational attainment  Judgement:   Fair  Insight:   Good         Vitals:  There were no vitals filed for this visit.     Summary of Psychotherapy Component:       Psychoeducation/Therapeutic Interventions during this visit:   __X_ Education re: symptoms, diagnosis and treatment options   __x_ Discussed physiologic factors that impact symptoms and stress   __X_ Discussed patterns of behavior and coping style that contribute to symptoms and presented alternative options   __X_ Identified cognitive distortions and presented alternative options   __X_ Reinforced boundaries of control and responsibility in symptom management   __X_ Education re: impact of alcohol/drugs on symptoms and discussed treatment resources/rationale   ___ Explored, identified, reinforced strategies and resources for coping with symptoms    __X_ Discussed medication options, risks, benefits with patient and/or family/guardian      Psychotherapeutic approach:  __X_Individual                          __X_Supportive          :     Other relevant content:  (see HPI for relevant details)     Time spent in therapy 45\"     Total time spent 60\"     Constantine Nj MD MPH        Instructions        Will start Depakote 250 b.I.d. and increase slowly;  Decrease Bupropion from 450 to 300 and decrease slowly  Continue Escitalopram at current dose (10 or 20??)  Followup in 2 weeks      "

## 2024-02-14 ENCOUNTER — OFFICE VISIT (OUTPATIENT)
Dept: BEHAVIORAL HEALTH | Facility: CLINIC | Age: 24
End: 2024-02-14
Payer: MEDICAID

## 2024-02-14 DIAGNOSIS — F90.2 ATTENTION DEFICIT HYPERACTIVITY DISORDER (ADHD), COMBINED TYPE: ICD-10-CM

## 2024-02-14 DIAGNOSIS — F31.81 BIPOLAR 2 DISORDER (MULTI): ICD-10-CM

## 2024-02-14 DIAGNOSIS — F19.10 SUBSTANCE ABUSE (MULTI): ICD-10-CM

## 2024-02-14 DIAGNOSIS — F43.23 ADJUSTMENT DISORDER WITH MIXED ANXIETY AND DEPRESSED MOOD: ICD-10-CM

## 2024-02-14 DIAGNOSIS — F41.9 ANXIETY: ICD-10-CM

## 2024-02-14 PROCEDURE — 99215 OFFICE O/P EST HI 40 MIN: CPT | Performed by: PSYCHIATRY & NEUROLOGY

## 2024-02-14 PROCEDURE — 99417 PROLNG OP E/M EACH 15 MIN: CPT | Performed by: PSYCHIATRY & NEUROLOGY

## 2024-02-14 PROCEDURE — 1036F TOBACCO NON-USER: CPT | Performed by: PSYCHIATRY & NEUROLOGY

## 2024-02-14 RX ORDER — ARIPIPRAZOLE 5 MG/1
5 TABLET ORAL DAILY
Qty: 30 TABLET | Refills: 2 | Status: SHIPPED | OUTPATIENT
Start: 2024-02-14 | End: 2024-03-13 | Stop reason: SDUPTHER

## 2024-02-14 RX ORDER — ARIPIPRAZOLE 5 MG/1
5 TABLET ORAL DAILY
Qty: 30 TABLET | Refills: 0 | Status: CANCELLED | OUTPATIENT
Start: 2024-02-14 | End: 2024-03-15

## 2024-02-14 RX ORDER — BUPROPION HYDROCHLORIDE 300 MG/1
300 TABLET ORAL EVERY MORNING
Qty: 90 TABLET | Refills: 1 | Status: SHIPPED | OUTPATIENT
Start: 2024-02-14 | End: 2024-08-12

## 2024-02-14 NOTE — PROGRESS NOTES
"Outpatient Psychiatry - Med Management Followup     Location of service:  __X_ Office ___ A/V ___Telephone (3 factor ID completed)      Subjective   Luciano Tejeda, a 23 y.o. male, for followup visit.      Assessment/Plan     Problem List Items Addressed This Visit             ICD-10-CM    Bipolar 2 disorder (CMS/HCC) F31.81   Anxiety  Substance Use Disorder (Cannabis, Alcohol)  Adjustment disorder with mixed emotions and conduct    Treatment Plan/Recommendations:   Discontinue Depakote (patient stopped due to side effects)  Start Aripiprazole 5 mg   Resume Lexapro (patient said he had discontinue but Mom says he has not)  Return to clinic in 4 weeks  Complete registration for [employer]    Follow-up plan was discussed with patient.    Referral to:  NA    Review with patient: Treatment plan reviewed with the patient.  Medication risks/benefit reviewed with the patient    HPI:    Feliciano returns for followup:  Mom seen briefly in [facility]  Started Depakote and thought it was helpful; thoughts clearer, ability to make decisions easier, felt more focus; stopped using cannabis  After about 5 days began having blurred vision which worsened over next few days so he discontinued  Continuing to game all night long most nights and work at [employer] 3 days a week; feeling bored, thinks that's why he vapes.  Only needs to complete one more semester of school for associate's degree; has started registration but still needs to order transcripts from school in [place] to be sent to [employer]  Agrees to go back on Aripiprazole, stick to 5 mg for now.  Has \"mostly stopped\" drinking and thinks he has definitely reduced use of cannabis    Current Medications:    Current Outpatient Medications:     buPROPion XL (Wellbutrin XL) 300 mg 24 hr tablet, , Disp: , Rfl:     cetirizine (ZYRTEC) 10 mg capsule, Take by mouth., Disp: , Rfl:     pantoprazole (ProtoNix) 40 mg EC tablet, Take 1 tablet (40 mg) by mouth once daily in the morning. Take " "before meals. Do not crush, chew, or split., Disp: 30 tablet, Rfl: 5    Interval Medical History:  No new changes    Psychiatric Review Of Systems:   Depressive Symptoms: NA  Manic Symptoms: mild disorganization waxes and wanes; some irritability, mostly with Mom  Anxiety Symptoms: minimal  Disordered Eating Symptoms: NA  Inattentive Symptoms: continues  Hyperactive/Impulsive Symptoms: NA  Trauma Symptoms: NA  Conduct Issues: NA  Psychotic Symptoms: NA  Developmental Concerns: NA  Other Symptoms/Concerns:NA  Delirium/Altered Mental Status Symptoms: NA       Objective   Mental Status Exam:     Patient is a well nourished, well developed young man  appearing to be approximately stated age  Appearance:   Well groomed, casually dressed in [employer] t-shirt and sweat pants  Attention:  Oriented X 3  Speech:         Form:  fluent, coherent, relevant, mild disorganization/tangentiality       Process:  abstract; with some tangential or circumstancial associations       Content:  without hallucinations, delusions,  denies suicidal or homicidal ideation  Movements: No apparent gait disturbance or abnormal involuntary movements of face or trunk  Mood:  \"Good\"  Affect:  Euthymic  Cognition:   Grossly intact and appropriate to level of educational attainment  Judgement:   Fair  Insight:   Good       Vitals:  There were no vitals filed for this visit.      Total time spent 60\"    Constantine Nj MD MPH  "

## 2024-03-07 DIAGNOSIS — F43.23 ADJUSTMENT DISORDER WITH MIXED ANXIETY AND DEPRESSED MOOD: ICD-10-CM

## 2024-03-07 DIAGNOSIS — F31.81 BIPOLAR 2 DISORDER (MULTI): ICD-10-CM

## 2024-03-13 ENCOUNTER — OFFICE VISIT (OUTPATIENT)
Dept: BEHAVIORAL HEALTH | Facility: CLINIC | Age: 24
End: 2024-03-13
Payer: MEDICAID

## 2024-03-13 DIAGNOSIS — F31.81 BIPOLAR 2 DISORDER (MULTI): ICD-10-CM

## 2024-03-13 DIAGNOSIS — F43.23 ADJUSTMENT DISORDER WITH MIXED ANXIETY AND DEPRESSED MOOD: ICD-10-CM

## 2024-03-13 DIAGNOSIS — F41.9 ANXIETY: ICD-10-CM

## 2024-03-13 DIAGNOSIS — F19.10 SUBSTANCE ABUSE (MULTI): ICD-10-CM

## 2024-03-13 PROCEDURE — 99214 OFFICE O/P EST MOD 30 MIN: CPT | Performed by: PSYCHIATRY & NEUROLOGY

## 2024-03-13 PROCEDURE — 1036F TOBACCO NON-USER: CPT | Performed by: PSYCHIATRY & NEUROLOGY

## 2024-03-13 RX ORDER — ARIPIPRAZOLE 5 MG/1
5 TABLET ORAL DAILY
Qty: 90 TABLET | Refills: 1 | OUTPATIENT
Start: 2024-03-13

## 2024-03-13 RX ORDER — ARIPIPRAZOLE 10 MG/1
10 TABLET ORAL DAILY
Qty: 30 TABLET | Refills: 2 | Status: SHIPPED | OUTPATIENT
Start: 2024-03-13 | End: 2024-04-17

## 2024-03-13 NOTE — PROGRESS NOTES
Progress Notes  Constantine Nj MD MPH (Physician)  Behavioral Health  Outpatient Psychiatry - Followup Medication+Psychotherapy     Location of service:  __X_ Office       ___ A/V            ___Telephone (3 factor ID completed)        Subjective   Luciano Tejeda, a 23 y.o. male, for followup visit.              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             Codes     Bipolar 2 disorder (CMS/[facility])     F31.81     Relevant Medications     Depakote 250 mg twice daily (prescribed today)  Reduce Bupropion from 450 mg to 300 mg daily  Discontinue Lexapro  Increase Aripiprazole to 10 mg daily            Treatment Goals:    Anxiety: acquiring relapse prevention skills, reducing negative automatic thoughts, and reducing physical symptoms of anxiety  Drug & Alcohol: reducing/eliminating substance use  Bipolar II:  Reducing;/eliminating mood swings, reducing/eliminating spending sprees, gambling, irritability  R/O ADHD:  Improve concentration/focus, reduce impulsivity - COMPLETED - no ADHD     Treatment Plan/Recommendations:   1. Continue Bupropion   2. Increase Aripiprazole to 10 mg  3. Discontinue Lexapro  4. Return to clinic in 4 weeks  5. Appointment with therapist confirmed     Follow-up plan was discussed with patient.     Referral to:  Therapy (pending)     Review with patient: Treatment plan reviewed with the patient.  Medication risks/benefit reviewed with the patient        HPI:    Patient returns for followup; Mom  joins for second half to report  He reports improvement with Abilify; able to concentrate better, more attention to tasks, less irritable, thinking about future  2.   Has gone to look at [employer] and transferred credits from [employer]; can get $3200 Scholarship money for fulltime; needs 3 more  "classes.  3. Still shira every night  4. Still taking both Bupropion and Lexapro - doesn't think he needs both.   (Mom prefers he stay on Lexapro but agrees to try this way)  5.  Thinks might be better at 10 mg dose; Mom thinks too much, \"how about 7.5?\"  Agrees to try 10 and to contact me if feeling oversedated.  6.  Says still vaping but \"only a puff\" and drinking - will be seeing therapist recommended by Dr. Lay next week.          Current Medications:     Current Outpatient Medications:     buPROPion XL (Wellbutrin XL) 300 mg     pantoprazole (ProtoNix) 40 mg EC tablet, (prescribed by PCP)       Esitalopram (to discontinue)      Ariprazole 5 mg (to increase to 10)       Interim Medical History and Review of Systems:  Noncontributory     Record Review: moderate     Psychiatric Review Of Systems:   Depressive Symptoms: NA  Manic Symptoms: Irritability, spending sprees, gambling history  Anxiety Symptoms: Minimal  Disordered Eating Symptoms: NA  Inattentive Symptoms: Constantly changing focus before finishing tasks; can't concentrate on reading, other tasks (but can do shira for hours)   Hyperactive/Impulsive Symptoms: See abpve  Trauma Symptoms: NA  Conduct Issues: NA  Psychotic Symptoms: NA  Developmental Concerns: NA  Other Symptoms/Concerns:NA  Delirium/Altered Mental Status Symptoms: NA        Objective   Mental Status Exam:  Patient is a well nourished, well developed young adult white male appearing to be approximately stated age  Appearance:   Well groomed, appropriately dressed   Attention:  Oriented X 3  Speech:         Form:  fluent, coherent, mildly pressured, no evidence for formal thought disorder       Process:  abstract; but with tangential associations       Content:  without hallucinations, delusions,  denies suicidal or homicidal ideation  Movements: No apparent gait disturbance or abnormal involuntary movements of face or trunk  Mood:  \"Good\"  Affect:  Euthymic/mildly elevated until Mom " "joins the conversation; then becomes confrontative, agitated, raises voice  Cognition:   Grossly intact and appropriate to level of educational attainment  Judgement:   Fair  Insight:   Good         Vitals:  There were no vitals filed for this visit.     Summary of Psychotherapy Component:       Psychoeducation/Therapeutic Interventions during this visit:   __X_ Education re: symptoms, diagnosis and treatment options   __x_ Discussed physiologic factors that impact symptoms and stress   __X_ Discussed patterns of behavior and coping style that contribute to symptoms and presented alternative options   __X_ Identified cognitive distortions and presented alternative options   __X_ Reinforced boundaries of control and responsibility in symptom management   __X_ Education re: impact of alcohol/drugs on symptoms and discussed treatment resources/rationale   ___ Explored, identified, reinforced strategies and resources for coping with symptoms    __X_ Discussed medication options, risks, benefits with patient and/or family/guardian      Psychotherapeutic approach:  __X_Individual                          __X_Supportive          :     Other relevant content:  (see HPI for relevant details)     Time spent in therapy 20\"     Total time spent 30\"     Constantine Nj MD MPH              "

## 2024-04-09 ENCOUNTER — OFFICE VISIT (OUTPATIENT)
Dept: PRIMARY CARE | Facility: CLINIC | Age: 24
End: 2024-04-09
Payer: MEDICAID

## 2024-04-09 VITALS — SYSTOLIC BLOOD PRESSURE: 116 MMHG | DIASTOLIC BLOOD PRESSURE: 68 MMHG | WEIGHT: 226 LBS

## 2024-04-09 DIAGNOSIS — J30.2 SEASONAL ALLERGIC RHINITIS, UNSPECIFIED TRIGGER: ICD-10-CM

## 2024-04-09 DIAGNOSIS — J30.2 SEASONAL ALLERGIES: Primary | ICD-10-CM

## 2024-04-09 PROCEDURE — 99213 OFFICE O/P EST LOW 20 MIN: CPT | Performed by: STUDENT IN AN ORGANIZED HEALTH CARE EDUCATION/TRAINING PROGRAM

## 2024-04-09 PROCEDURE — 1036F TOBACCO NON-USER: CPT | Performed by: STUDENT IN AN ORGANIZED HEALTH CARE EDUCATION/TRAINING PROGRAM

## 2024-04-09 RX ORDER — POLYMYXIN B SULFATE AND TRIMETHOPRIM 1; 10000 MG/ML; [USP'U]/ML
1 SOLUTION OPHTHALMIC
Qty: 10 ML | Refills: 0 | Status: SHIPPED | OUTPATIENT
Start: 2024-04-09 | End: 2024-04-16

## 2024-04-09 RX ORDER — FLUTICASONE PROPIONATE 50 MCG
1 SPRAY, SUSPENSION (ML) NASAL DAILY
Qty: 16 G | Refills: 5 | Status: SHIPPED | OUTPATIENT
Start: 2024-04-09 | End: 2024-05-08 | Stop reason: WASHOUT

## 2024-04-09 RX ORDER — LORATADINE 10 MG/1
10 TABLET ORAL DAILY
Qty: 30 TABLET | Refills: 5 | Status: SHIPPED | OUTPATIENT
Start: 2024-04-09 | End: 2024-05-08 | Stop reason: WASHOUT

## 2024-04-09 RX ORDER — SODIUM CHLORIDE/ALOE VERA
1 GEL (GRAM) NASAL AS NEEDED
Qty: 14.1 G | Refills: 0 | Status: SHIPPED | OUTPATIENT
Start: 2024-04-09 | End: 2024-05-08 | Stop reason: WASHOUT

## 2024-04-09 NOTE — PROGRESS NOTES
Subjective   Patient ID: Luciano Tejeda is a 24 y.o. male who presents for Eye Drainage (L eye discharge / pain - got poked), Epistaxis (Nose Bleed) (X3 days / when nose bleeds happen - coughing up blood occasionally (on / off last few weeks) ), and Nasal Congestion (Weak / warm ).  HPI  Feliciano is here for sick visit.    He reports sinus pressure, rhinorrhea, nasal congestion, headaches, ear pressure, itchy watery eyes for the last ~1 week. He experienced 3 days of epistaxis, right nares. He reports waking up with eye crusting discharge this morning and difficulty seeing out of her left eye because of swelling this morning. No fevers, chills, abdominal pain, CP, SOB, cough.    Review of Systems  12-point ROS was reviewed and is negative, unless otherwise noted in HPI    Objective   Vitals:    04/09/24 1151   BP: 116/68      Physical Exam  GEN: alert, conversant, NAD  HEENT: PERRL, EOMI,dried blood in right naris, left eye with conjunctival injection, drainage. Nasal mucosa erythematous and edematous  NECK: supple, no LAD appreciated  CHEST: CTAB  CV: S1, S2, RRR, no murmurs appreciated  ABD: soft, NT, ND  EXT: no significant LE edema  SKIN: warm, dry    Assessment/Plan   #allergic rhinitis  #seasonal allergies  #epistaxis  #left eye bacterial conjunctivitis  Plan:  - Start using claritin 10mg daily, flonase nasal spray bilaterally  - use nasal saline gel PRN  - Complete course of polymyxin eye drops for left eye x1 week  - Stay well hydrated    RTC as scheduled, sooner PRN     Jj Barron DO

## 2024-04-10 ENCOUNTER — OFFICE VISIT (OUTPATIENT)
Dept: BEHAVIORAL HEALTH | Facility: CLINIC | Age: 24
End: 2024-04-10
Payer: MEDICAID

## 2024-04-10 DIAGNOSIS — F19.10 SUBSTANCE ABUSE (MULTI): ICD-10-CM

## 2024-04-10 DIAGNOSIS — F90.2 ATTENTION DEFICIT HYPERACTIVITY DISORDER (ADHD), COMBINED TYPE: ICD-10-CM

## 2024-04-10 DIAGNOSIS — F41.9 ANXIETY: ICD-10-CM

## 2024-04-10 DIAGNOSIS — F31.81 BIPOLAR 2 DISORDER (MULTI): ICD-10-CM

## 2024-04-10 PROCEDURE — 99214 OFFICE O/P EST MOD 30 MIN: CPT | Performed by: PSYCHIATRY & NEUROLOGY

## 2024-04-10 NOTE — PROGRESS NOTES
Progress Notes  Constantine Nj MD MPH (Physician)  Behavioral Health  Outpatient Psychiatry - Followup Medication+Psychotherapy     Location of service:  __X_ Office       ___ A/V            ___Telephone (3 factor ID completed)        Subjective   Luciano Tejeda, a 23 y.o. male, for followup visit.              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 Continue Bupropion   2. Continue Aripiprazol  3. Return to clinic in 4 weeks  4. Continuing work with therapist     Follow-up plan was discussed with patient.        Review with patient: Treatment plan reviewed with the patient.  Medication risks/benefit reviewed with the patient        HPI:    Patient returns for followup; Mom  joins for second half to report  He again reports improvement with Abilify; able to concentrate better, more attention to tasks, less irritable, thinking about future  Says he has not smoked cannabis in several weeks, although admits to occasional vaping.  Still smoking cigarettes.  Still shira most nights but occasionally goes to bed early now; feels tired at end of day.  Still having occasional blowups with Mom; 4 days ago she came up at 2 a.m. to tell him to stop shira and  "drugging; he got angry and yelled at her, work up the neighbor.  He apologized to neighbor.  Still planning to take 3 courses this summer; chemistry, chem lab, english; now thinking about becoming a  rather than  ; Mom has been helpful in researching opportunities        Current Medications:     Current Outpatient Medications:     buPROPion XL (Wellbutrin XL) 300 mg     aripiprazole 10 mg  (+certizine, fluticonasone, loratadine, pantoprazole, polymixin from others)        Interim Medical History and Review of Systems:  Saw PCP for bad sinuses earlier this week; told probably spring allergies and \"pressure\" so to increase antihistamines as above; also had eye irritation for which prescribed polymyxin B sulf-trimethoprin ophthalmic solution.     Record Review: moderate     Psychiatric Review Of Systems:   Depressive Symptoms: NA  Manic Symptoms: Less irritability; improving sleep habits; more thoughtful re: future planning  Anxiety Symptoms: Minimal  Disordered Eating Symptoms: NA  Inattentive Symptoms: improved with increased meds  Hyperactive/Impulsive Symptoms: See abpve  Trauma Symptoms: NA  Conduct Issues: NA  Psychotic Symptoms: NA  Developmental Concerns: NA  Other Symptoms/Concerns:NA  Delirium/Altered Mental Status Symptoms: NA        Objective   Mental Status Exam:  Patient is a well nourished, well developed young adult white male appearing to be approximately stated age  Appearance:   Well groomed, appropriately dressed   Attention:  Oriented X 3  Speech:  fluent, coherent, mildly pressured, no evidence for formal thought disorder      Process:  abstract; occasional concrete associations       Content:  without hallucinations, delusions,  denies SI, HI  Movements: No apparent gait disturbance or abnormal involuntary movements of face or trunk  Mood:  \"Good\"  Affect:  Euthymic  Cognition:   Grossly intact and appropriate to level of educational attainment  Judgement:   Fair  Insight:   " "Good         Vitals:  There were no vitals filed for this visit.     Summary of Psychotherapy Component:       Psychoeducation/Therapeutic Interventions during this visit:   __X_ Education re: symptoms, diagnosis and treatment options   __x_ Discussed physiologic factors that impact symptoms and stress   __X_ Discussed patterns of behavior and coping style that contribute to symptoms and presented alternative options   __X_ Identified cognitive distortions and presented alternative options   __X_ Reinforced boundaries of control and responsibility in symptom management   __X_ Education re: impact of alcohol/drugs on symptoms and discussed treatment resources/rationale   ___ Explored, identified, reinforced strategies and resources for coping with symptoms    __X_ Discussed medication options, risks, benefits with patient and/or family/guardian      Psychotherapeutic approach:  __X_Individual                          __X_Supportive          :     Other relevant content:  (see HPI for relevant details)     Time spent in therapy 20\"     Total time spent 30\"     Constantine Nj MD MPH              "

## 2024-04-13 ENCOUNTER — HOSPITAL ENCOUNTER (EMERGENCY)
Facility: HOSPITAL | Age: 24
Discharge: HOME | End: 2024-04-13
Payer: MEDICAID

## 2024-04-13 ENCOUNTER — APPOINTMENT (OUTPATIENT)
Dept: RADIOLOGY | Facility: HOSPITAL | Age: 24
End: 2024-04-13
Payer: MEDICAID

## 2024-04-13 VITALS
RESPIRATION RATE: 20 BRPM | SYSTOLIC BLOOD PRESSURE: 144 MMHG | DIASTOLIC BLOOD PRESSURE: 72 MMHG | BODY MASS INDEX: 27.4 KG/M2 | HEIGHT: 76 IN | TEMPERATURE: 98.2 F | HEART RATE: 72 BPM | OXYGEN SATURATION: 97 % | WEIGHT: 225 LBS

## 2024-04-13 DIAGNOSIS — J01.90 ACUTE SINUSITIS, RECURRENCE NOT SPECIFIED, UNSPECIFIED LOCATION: Primary | ICD-10-CM

## 2024-04-13 DIAGNOSIS — R59.1 LYMPHADENOPATHY: ICD-10-CM

## 2024-04-13 LAB
ALBUMIN SERPL BCP-MCNC: 4.1 G/DL (ref 3.4–5)
ALP SERPL-CCNC: 48 U/L (ref 33–120)
ALT SERPL W P-5'-P-CCNC: 14 U/L (ref 10–52)
ANION GAP SERPL CALC-SCNC: 12 MMOL/L (ref 10–20)
AST SERPL W P-5'-P-CCNC: 12 U/L (ref 9–39)
BASOPHILS # BLD AUTO: 0.03 X10*3/UL (ref 0–0.1)
BASOPHILS NFR BLD AUTO: 0.3 %
BILIRUB SERPL-MCNC: 0.4 MG/DL (ref 0–1.2)
BUN SERPL-MCNC: 8 MG/DL (ref 6–23)
CALCIUM SERPL-MCNC: 9.4 MG/DL (ref 8.6–10.3)
CHLORIDE SERPL-SCNC: 103 MMOL/L (ref 98–107)
CO2 SERPL-SCNC: 28 MMOL/L (ref 21–32)
CREAT SERPL-MCNC: 0.83 MG/DL (ref 0.5–1.3)
EGFRCR SERPLBLD CKD-EPI 2021: >90 ML/MIN/1.73M*2
EOSINOPHIL # BLD AUTO: 0.12 X10*3/UL (ref 0–0.7)
EOSINOPHIL NFR BLD AUTO: 1.3 %
ERYTHROCYTE [DISTWIDTH] IN BLOOD BY AUTOMATED COUNT: 11.2 % (ref 11.5–14.5)
FLUAV RNA RESP QL NAA+PROBE: NOT DETECTED
FLUBV RNA RESP QL NAA+PROBE: NOT DETECTED
GLUCOSE SERPL-MCNC: 100 MG/DL (ref 74–99)
HCT VFR BLD AUTO: 42.6 % (ref 41–52)
HETEROPH AB SERPLBLD QL IA.RAPID: NEGATIVE
HGB BLD-MCNC: 14.6 G/DL (ref 13.5–17.5)
IMM GRANULOCYTES # BLD AUTO: 0.02 X10*3/UL (ref 0–0.7)
IMM GRANULOCYTES NFR BLD AUTO: 0.2 % (ref 0–0.9)
LYMPHOCYTES # BLD AUTO: 1.57 X10*3/UL (ref 1.2–4.8)
LYMPHOCYTES NFR BLD AUTO: 16.5 %
MCH RBC QN AUTO: 29.9 PG (ref 26–34)
MCHC RBC AUTO-ENTMCNC: 34.3 G/DL (ref 32–36)
MCV RBC AUTO: 87 FL (ref 80–100)
MONOCYTES # BLD AUTO: 1.03 X10*3/UL (ref 0.1–1)
MONOCYTES NFR BLD AUTO: 10.8 %
NEUTROPHILS # BLD AUTO: 6.74 X10*3/UL (ref 1.2–7.7)
NEUTROPHILS NFR BLD AUTO: 70.9 %
NRBC BLD-RTO: 0 /100 WBCS (ref 0–0)
PLATELET # BLD AUTO: 286 X10*3/UL (ref 150–450)
POTASSIUM SERPL-SCNC: 4.1 MMOL/L (ref 3.5–5.3)
PROT SERPL-MCNC: 7.7 G/DL (ref 6.4–8.2)
RBC # BLD AUTO: 4.89 X10*6/UL (ref 4.5–5.9)
SARS-COV-2 RNA RESP QL NAA+PROBE: NOT DETECTED
SODIUM SERPL-SCNC: 139 MMOL/L (ref 136–145)
WBC # BLD AUTO: 9.5 X10*3/UL (ref 4.4–11.3)

## 2024-04-13 PROCEDURE — 87636 SARSCOV2 & INF A&B AMP PRB: CPT | Performed by: PHYSICIAN ASSISTANT

## 2024-04-13 PROCEDURE — 70491 CT SOFT TISSUE NECK W/DYE: CPT

## 2024-04-13 PROCEDURE — 70491 CT SOFT TISSUE NECK W/DYE: CPT | Performed by: RADIOLOGY

## 2024-04-13 PROCEDURE — 85025 COMPLETE CBC W/AUTO DIFF WBC: CPT | Performed by: PHYSICIAN ASSISTANT

## 2024-04-13 PROCEDURE — 36415 COLL VENOUS BLD VENIPUNCTURE: CPT | Performed by: PHYSICIAN ASSISTANT

## 2024-04-13 PROCEDURE — 2550000001 HC RX 255 CONTRASTS: Performed by: PHYSICIAN ASSISTANT

## 2024-04-13 PROCEDURE — 99284 EMERGENCY DEPT VISIT MOD MDM: CPT | Mod: 25

## 2024-04-13 PROCEDURE — 86308 HETEROPHILE ANTIBODY SCREEN: CPT | Performed by: PHYSICIAN ASSISTANT

## 2024-04-13 PROCEDURE — 80053 COMPREHEN METABOLIC PANEL: CPT | Performed by: PHYSICIAN ASSISTANT

## 2024-04-13 RX ORDER — AMOXICILLIN AND CLAVULANATE POTASSIUM 875; 125 MG/1; MG/1
1 TABLET, FILM COATED ORAL EVERY 12 HOURS
Qty: 14 TABLET | Refills: 0 | Status: SHIPPED | OUTPATIENT
Start: 2024-04-13 | End: 2024-04-23

## 2024-04-13 RX ADMIN — IOHEXOL 75 ML: 350 INJECTION, SOLUTION INTRAVENOUS at 17:06

## 2024-04-13 ASSESSMENT — PAIN DESCRIPTION - PAIN TYPE: TYPE: ACUTE PAIN

## 2024-04-13 ASSESSMENT — COLUMBIA-SUICIDE SEVERITY RATING SCALE - C-SSRS
1. IN THE PAST MONTH, HAVE YOU WISHED YOU WERE DEAD OR WISHED YOU COULD GO TO SLEEP AND NOT WAKE UP?: NO
6. HAVE YOU EVER DONE ANYTHING, STARTED TO DO ANYTHING, OR PREPARED TO DO ANYTHING TO END YOUR LIFE?: NO
2. HAVE YOU ACTUALLY HAD ANY THOUGHTS OF KILLING YOURSELF?: NO

## 2024-04-13 ASSESSMENT — PAIN SCALES - GENERAL: PAINLEVEL_OUTOF10: 8

## 2024-04-13 ASSESSMENT — PAIN - FUNCTIONAL ASSESSMENT: PAIN_FUNCTIONAL_ASSESSMENT: 0-10

## 2024-04-13 NOTE — ED PROVIDER NOTES
Limitations to History: None  External Records Reviewed  Independent Historians: None  Social determinants affecting care: None    HPI  Luciano Tejeda is a 24 y.o. male with history of anxiety, depression, GERD, who presents emergency department with his mother for assessment of sinus issues.  He reports for the last week he has had some nasal congestion, rhinorrhea, sore throat, enlarged lymph nodes on the left.  He denies any fever or chills.  He denies chest pains or shortness of breath.  He reports that this has been ongoing for the last year.  He also intermittently gets right-sided nosebleeds.  He went to his PCP this past week and was told it was allergies and was started on Claritin however he has not had any improvement of symptoms.  He reports that over the last year has had intermittent night sweats as well.  His dad has a history of Hodgkin's lymphoma and mom is very concerned that he could have this.  He has not had any unintentional weight loss.  The patient and his mother have no further complaints.    PMH  Past Medical History:   Diagnosis Date    Anxiety     Depression     GERD (gastroesophageal reflux disease)     reviewed by myself.    Meds  Current Outpatient Medications   Medication Instructions    amoxicillin-pot clavulanate (Augmentin) 875-125 mg tablet 1 tablet, oral, Every 12 hours    ARIPiprazole (ABILIFY) 10 mg, oral, Daily    buPROPion XL (WELLBUTRIN XL) 300 mg, oral, Every morning, Do not crush, chew, or split.    cetirizine (ZYRTEC) 10 mg capsule oral    fluticasone (Flonase) 50 mcg/actuation nasal spray 1 spray, Each Nostril, Daily, Shake gently. Before first use, prime pump. After use, clean tip and replace cap.    loratadine (CLARITIN) 10 mg, oral, Daily    pantoprazole (PROTONIX) 40 mg, oral, Daily before breakfast, Do not crush, chew, or split.    polymyxin B sulf-trimethoprim (Polytrim) ophthalmic solution 1 drop, Left Eye, 6 times daily    sodium chloride-Aloe vera gel (Ayr  "Saline) gel topical gel 1 Application, nasal, As needed       Allergies  No Known Allergies reviewed by myself.    SHx  Social History     Tobacco Use    Smoking status: Former     Types: Cigarettes    Smokeless tobacco: Former   Vaping Use    Vaping status: Every Day   Substance Use Topics    Alcohol use: Yes     Comment: once in a while    Drug use: Yes     Types: Marijuana    reviewed by myself.      ------------------------------------------------------------------------------------------------------------------------------------------    /72   Pulse 72   Temp 36.8 °C (98.2 °F)   Resp 20   Ht 1.93 m (6' 4\")   Wt 102 kg (225 lb)   SpO2 97%   BMI 27.39 kg/m²     Physical Exam  Vitals and nursing note reviewed.   Constitutional:       General: He is not in acute distress.     Appearance: Normal appearance. He is normal weight. He is not ill-appearing or toxic-appearing.   HENT:      Head: Normocephalic.      Right Ear: Tympanic membrane, ear canal and external ear normal.      Left Ear: Tympanic membrane, ear canal and external ear normal.      Nose: Nose normal.      Mouth/Throat:      Mouth: Mucous membranes are moist.      Pharynx: Oropharynx is clear. Uvula midline. Posterior oropharyngeal erythema present. No pharyngeal swelling.      Tonsils: No tonsillar exudate or tonsillar abscesses.   Eyes:      General:         Right eye: No discharge.         Left eye: No discharge.      Extraocular Movements: Extraocular movements intact.      Conjunctiva/sclera: Conjunctivae normal.      Pupils: Pupils are equal, round, and reactive to light.   Cardiovascular:      Rate and Rhythm: Normal rate and regular rhythm.   Pulmonary:      Effort: Pulmonary effort is normal.      Breath sounds: Normal breath sounds.   Abdominal:      General: Abdomen is flat.      Palpations: Abdomen is soft.      Tenderness: There is no abdominal tenderness.   Musculoskeletal:         General: Normal range of motion.      " Cervical back: Normal range of motion and neck supple. No rigidity.   Lymphadenopathy:      Cervical: Cervical adenopathy present.      Left cervical: Posterior cervical adenopathy present.   Skin:     General: Skin is warm and dry.      Capillary Refill: Capillary refill takes less than 2 seconds.   Neurological:      General: No focal deficit present.      Mental Status: He is alert and oriented to person, place, and time.   Psychiatric:         Mood and Affect: Mood normal.         Behavior: Behavior normal.          ------------------------------------------------------------------------------------------------------------------------------------------  Labs  Labs Reviewed   CBC WITH AUTO DIFFERENTIAL - Abnormal       Result Value    WBC 9.5      nRBC 0.0      RBC 4.89      Hemoglobin 14.6      Hematocrit 42.6      MCV 87      MCH 29.9      MCHC 34.3      RDW 11.2 (*)     Platelets 286      Neutrophils % 70.9      Immature Granulocytes %, Automated 0.2      Lymphocytes % 16.5      Monocytes % 10.8      Eosinophils % 1.3      Basophils % 0.3      Neutrophils Absolute 6.74      Immature Granulocytes Absolute, Automated 0.02      Lymphocytes Absolute 1.57      Monocytes Absolute 1.03 (*)     Eosinophils Absolute 0.12      Basophils Absolute 0.03     COMPREHENSIVE METABOLIC PANEL - Abnormal    Glucose 100 (*)     Sodium 139      Potassium 4.1      Chloride 103      Bicarbonate 28      Anion Gap 12      Urea Nitrogen 8      Creatinine 0.83      eGFR >90      Calcium 9.4      Albumin 4.1      Alkaline Phosphatase 48      Total Protein 7.7      AST 12      Bilirubin, Total 0.4      ALT 14     SARS-COV-2 AND INFLUENZA A/B PCR - Normal    Flu A Result Not Detected      Flu B Result Not Detected      Coronavirus 2019, PCR Not Detected      Narrative:     This assay has received FDA Emergency Use Authorization (EUA) and  is only authorized for the duration of time that circumstances exist to justify the authorization of  the emergency use of in vitro diagnostic tests for the detection of SARS-CoV-2 virus and/or diagnosis of COVID-19 infection under section 564(b)(1) of the Act, 21 U.S.C. 360bbb-3(b)(1). Testing for SARS-CoV-2 is only recommended for patients who meet current clinical and/or epidemiological criteria as defined by federal, state, or local public health directives. This assay is an in vitro diagnostic nucleic acid amplification test for the qualitative detection of SARS-CoV-2, Influenza A, and Influenza B from nasopharyngeal specimens and has been validated for use at [facility]. Negative results do not preclude COVID-19 infections or Influenza A/B infections, and should not be used as the sole basis for diagnosis, treatment, or other management decisions. If Influenza A/B and RSV PCR results are negative, testing for Parainfluenza virus, Adenovirus and Metapneumovirus is routinely performed for [facility] [place], and is available on other patients by placing an add-on request.    MONONUCLEOSIS SCREEN (HETEROPHILE ANTIBODY) - Normal    Mononucleosis Screen Negative          Imaging  CT soft tissue neck w IV contrast   Final Result   1. Cervical lymphadenopathy is nonspecific. While it is conceivable   this could be reactive in nature, other etiologies including   lymphoma, metastatic disease, sarcoid, among others could give this   appearance.   2. Inflammatory changes of the paranasal sinuses. The presence of   fluid could reflect acute sinusitis in the appropriate clinical   setting.   3. Fullness of the nasopharyngeal and oropharyngeal soft tissues is   nonspecific but may reflect lymphoid hyperplasia.   4. Possible lingual thyroid.                       MACRO:   None        Signed by: Skyla Caldwell 4/13/2024 5:13 PM   Dictation workstation:   [id number]           ED Course  Diagnoses as of 04/13/24 1738   Acute sinusitis, recurrence not specified,  unspecified location   Lymphadenopathy        Medical Decision Making: He did not appear ill or toxic.  Vital signs reviewed and stable.  He is willing secretions without any difficulties.  He does have some lymphadenopathy on the left.  Symptoms could be viral however had a discussion with the patient and his mother about obtaining labs and imaging and they would prefer to do this to be more thorough.  Workup was initiated.    Differential diagnoses considered: Lymphoma, viral illness, lymphangitis, others    I reviewed the labs from today.  No leukocytosis or leukopenia.  H&H stable.  Normal platelets.  No significant electrolyte abnormalities.  COVID influenza negative.  Mononucleosis negative.  CT of the neck is showing cervical lymphadenopathy that is nonspecific.  She also inflammatory changes of the paranasal sinuses that could reflect acute sinusitis.  There is also some fullness of the nasopharyngeal and oropharyngeal soft tissues that is nonspecific and possible lingual thyroid.  I discussed with the patient and his mother about the workup today.  I will treat him for sinusitis with Augmentin 875 mg twice daily for the next 10 days.  He is referred to ear nose and throat on-call.  He was provided with 2 specialist.  He was instructed to call first thing Monday morning for close follow-up appointment.  He will need further assessment of the lymphadenopathy.  I discussed with the patient and his mother that the lymphadenopathy could be due to the sinus infection however cannot fully exclude lymphoma.  Discussed with the patient and his mother to return to the ER immediately if symptoms change or worsen.  He verbalized understanding and agreed to plan of care bradycardia was discharged home in stable condition with his mother.    Diagnosis: Lymphadenopathy, acute sinusitis  Plan: Discharge           Luke Cedeno PA-C  04/13/24 3160

## 2024-04-13 NOTE — DISCHARGE INSTRUCTIONS
You were given a prescription to treat the sinusitis.  This should improve some of your symptoms.  The CT scan was nonconclusive.  Your lymph nodes could be enlarged due to infection.  I do recommend you follow-up with ear nose and throat due to the ongoing symptoms with enlarged lymph nodes.  Return to the ER immediately if symptoms change or worsen.

## 2024-05-03 ENCOUNTER — OFFICE VISIT (OUTPATIENT)
Dept: OTOLARYNGOLOGY | Facility: HOSPITAL | Age: 24
End: 2024-05-03
Payer: MEDICAID

## 2024-05-03 VITALS — TEMPERATURE: 97.2 F | HEIGHT: 76 IN | WEIGHT: 223 LBS | BODY MASS INDEX: 27.16 KG/M2

## 2024-05-03 DIAGNOSIS — R59.1 LYMPHADENOPATHY: ICD-10-CM

## 2024-05-03 PROCEDURE — 99204 OFFICE O/P NEW MOD 45 MIN: CPT | Performed by: OTOLARYNGOLOGY

## 2024-05-03 PROCEDURE — 99214 OFFICE O/P EST MOD 30 MIN: CPT | Performed by: OTOLARYNGOLOGY

## 2024-05-03 PROCEDURE — 1036F TOBACCO NON-USER: CPT | Performed by: OTOLARYNGOLOGY

## 2024-05-03 NOTE — PROGRESS NOTES
New Patient Visit  This is a 24 year old male who presents with a one year history of painless lymphadenopathy and night sweats. Of note, his father has a history of Hodgkins lymphoma which was diagnosed at 25. He denies any fevers, shortness of breath, or weight loss but states that he feels chronically fatigued and has suffered from recurrent upper respiratory infections. He has been treated with multiple rounds of antibiotics without complete resolution of symptoms. He had a CT which showed bilateral cervical lymphadenopathy and signs of sinusitis. He was treated with antibiotics and nasal sprays which resolved his sinonasal symptoms. Labs have been normal without cell count abnormalities aside from a positive MERRICK. He denies any foreign travel, new vaccines, or exposure to cat scratches or exotic animals.      Past Medical History  He has a past medical history of Anxiety, Depression, and GERD (gastroesophageal reflux disease).    Surgical History  He has a past surgical history that includes Finger surgery.     Social History  He reports that he has never smoked. He has quit using smokeless tobacco. He reports current alcohol use. He reports current drug use. Drug: Marijuana.    Family History  Family History   Problem Relation Name Age of Onset    Hodgkin's lymphoma Father      Alcohol abuse Father          Allergies  Patient has no known allergies.    Review of Systems  See above    Objective  PHYSICAL EXAMINATION:  Constitutional:  No acute distress  Voice:  No hoarseness or other abnormality   Respiration:  Breathing comfortably, no stridor  Eyes:  EOM intact, sclera normal  Neuro:  Alert and conversive  Head and Face:  Symmetric facial features, no masses or lesions  Salivary Glands:  Parotid and submandibular glands normal bilaterally  Right Ear:  Normal external ear, external auditory canal, and TM to otoscopy  Left Ear: Normal external ear, external auditory canal, and TM to otoscopy  Nose:  External  "nose midline, anterior rhinoscopy is normal with limited visualization to the anterior aspect of the inferior turbinates, no bleeding or drainage, no lesions  Oral Cavity/Oropharynx/Lips:  Normal mucous membranes, normal floor of mouth/tongue/OP, no masses or lesions, tonsils 2+ bilaterally and non-inflamed   Neck/Lymph:  Shotty palpable lymphadenopathy, one palpable nontender, pea-size, mobile node within left level V, no thyroid masses, trachea midline  Skin:  Neck skin is without scar or injury  Psych:  Alert with appropriate mood and affect     Last Recorded Vitals  Temperature 36.2 °C (97.2 °F), height 1.93 m (6' 4\"), weight 101 kg (223 lb).    Assessment/Plan   This is a 24 year old male who presented with a one year history of persistent cervical lymphadenopathy. His exam / scan is not particularly concerning however his mom seems very worried due to the family history.    Will schedule surgery for excisional lymph node biopsy. Discussed that we would likely just get a couple of the level V ones on the left since those are more superficial, and off/on palpable. The largest is a 1.9 cm in right level II but that's non palpable and not really much different in appearance than the others.  Risks were discussed including scarring, pain, swelling, and injury to neurovascular structures. The patient and his mother agreed to proceed.     Nella Chavarria MD      "

## 2024-05-06 PROBLEM — G47.00 INSOMNIA: Status: ACTIVE | Noted: 2023-11-03

## 2024-05-06 PROBLEM — J30.2 SEASONAL ALLERGIES: Status: ACTIVE | Noted: 2024-05-06

## 2024-05-06 PROBLEM — L74.9 DISORDER OF SWEAT GLANDS: Status: ACTIVE | Noted: 2024-05-06

## 2024-05-06 PROBLEM — F31.81 BIPOLAR II DISORDER (MULTI): Status: ACTIVE | Noted: 2023-12-06

## 2024-05-07 ENCOUNTER — OFFICE VISIT (OUTPATIENT)
Dept: PRIMARY CARE | Facility: CLINIC | Age: 24
End: 2024-05-07
Payer: MEDICAID

## 2024-05-07 VITALS — BODY MASS INDEX: 27.02 KG/M2 | DIASTOLIC BLOOD PRESSURE: 40 MMHG | SYSTOLIC BLOOD PRESSURE: 100 MMHG | WEIGHT: 222 LBS

## 2024-05-07 DIAGNOSIS — F41.9 ANXIETY: ICD-10-CM

## 2024-05-07 DIAGNOSIS — J30.2 SEASONAL ALLERGIES: Primary | ICD-10-CM

## 2024-05-07 PROCEDURE — 99213 OFFICE O/P EST LOW 20 MIN: CPT | Performed by: STUDENT IN AN ORGANIZED HEALTH CARE EDUCATION/TRAINING PROGRAM

## 2024-05-07 NOTE — PROGRESS NOTES
Subjective   Patient ID: Luciano Tejeda is a 24 y.o. male who presents for Follow-up.    HPI  Feeling with low energy levels. Not motivated.   Bored. 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 Talked about recent lab results.  Rechecked blood pressure 110/80 mmHg.  Due to fatigue and night sweats will have end of May lymph node removal from left side of his neck.  Denied: fever, headache, visual changes, chest pain, palpitations, SOB, abdomen pain, urinary symptoms, bloody stools, falls, and loss of consciousness.        Review of Systems  A 12 point review of systems was performed and all systems were negative/normal except what is noted in the HPI. Please refer to the HPI for details.      Objective   Physical Exam  General: alert and oriented. No acute distress.  HEENT: oral mucosa moist, EOMI.  CHEST: clear breath sounds, no crackles, no wheeze, no tachypnea.  CVS: regular rate and rhythm, no murmurs.   ABD: Soft, Non Tender, BS present.  EXT: no edema.  SKIN: no rash.  NEURO: Nonfocal grossly.      Assessment/Plan     #Allergic rhinitis  #seasonal allergies  #epistaxis  -claritin 10mg PRN, flonase nasal spray bilaterally  -use nasal saline gel PRN    #Cervical lymph nodes  -will have biopsy end of May   -c/o fatigue and on and off night sweats  -family hx of Hodgkin's Lymphoma (father)    #Anxiety   #Bipolar II  -on bupropion and aripiprazole   -seeing behavior health every month     RTC 6 months       Marla Blackwell MD 05/07/24 3:58 PM Follow up

## 2024-05-08 ENCOUNTER — OFFICE VISIT (OUTPATIENT)
Dept: BEHAVIORAL HEALTH | Facility: CLINIC | Age: 24
End: 2024-05-08
Payer: MEDICAID

## 2024-05-08 DIAGNOSIS — F90.2 ATTENTION DEFICIT HYPERACTIVITY DISORDER (ADHD), COMBINED TYPE: ICD-10-CM

## 2024-05-08 DIAGNOSIS — F19.10 SUBSTANCE ABUSE (MULTI): ICD-10-CM

## 2024-05-08 DIAGNOSIS — F31.81 BIPOLAR 2 DISORDER (MULTI): ICD-10-CM

## 2024-05-08 DIAGNOSIS — F41.9 ANXIETY: ICD-10-CM

## 2024-05-08 DIAGNOSIS — F43.23 ADJUSTMENT DISORDER WITH MIXED ANXIETY AND DEPRESSED MOOD: ICD-10-CM

## 2024-05-08 PROCEDURE — 99214 OFFICE O/P EST MOD 30 MIN: CPT | Performed by: PSYCHIATRY & NEUROLOGY

## 2024-05-08 RX ORDER — ARIPIPRAZOLE 5 MG/1
5 TABLET ORAL DAILY
Qty: 30 TABLET | Refills: 2 | Status: SHIPPED | OUTPATIENT
Start: 2024-05-08 | End: 2024-08-06

## 2024-05-08 RX ORDER — BUPROPION HYDROCHLORIDE 150 MG/1
150 TABLET ORAL EVERY MORNING
Qty: 30 TABLET | Refills: 11 | Status: SHIPPED | OUTPATIENT
Start: 2024-05-08 | End: 2025-05-08

## 2024-05-08 NOTE — PROGRESS NOTES
"Outpatient Psychiatry - Followup Medication+Psychotherapy     Location of service:  __X_ Office       ___ A/V            ___Telephone (3 factor ID completed)        Subjective   Luciano Tejeda, a 24 y.o. male, for followup visit.              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             Codes     Bipolar 2 disorder (CMS/[facility])     F31.81     Relevant Medications    Bupropion 300 mg  Abilify (Aripiprazole) 10 mg            Treatment Goals:    Anxiety: acquiring relapse prevention skills, reducing negative automatic thoughts, and reducing physical symptoms of anxiety  Drug & Alcohol: reducing/eliminating substance use  Bipolar II:  Reducing;/eliminating mood swings, reducing/eliminating spending sprees, gambling, irritability  R/O ADHD:  Improve concentration/focus, reduce impulsivity - COMPLETED - no ADHD     Treatment Plan/Recommendations:   Reduce Bupropion to 150 mg X 2 weeks then discontinue  2. Reduce Aripiprazole to 5 mg  3. Return to clinic in 4 weeks - consider Escitalopram if depression worsens??  4. Continuing work with therapist     Follow-up plan was discussed with patient.        Review with patient: Treatment plan reviewed with the patient.  Medication risks/benefit reviewed with the patient        HPI:    Patient returns for followup; Mom  joins fat beginning to give me \"her view:\"  She says he seems overmedicated, sleeps late, has no motivation.  He agrees.  Says he has not smoked cannabis in several weeks, although admits to occasional vaping.  Now vaping nicotine rather than smoking cigarettes  Still shira most nights but occasionally goes to bed early now; feels tired at end of day.  Still having occasional blowups with Mom; she tells him to take his medications, he does but then she accuses him of not " "taking.  Resulted in double medicating on at least one occasion.  Has changed major to \"\" but too late for this year; will take an intro class in fall and do shadowing experience.  So may not have courses this summer.  Still at [employer], going to confront manager about not getting his annual raise.  Has not investigated other jobs as yet, but agrees he needs to make more in order to live iindependently.  Seeing therapist here every other week, likes him, thinks going wel        Current Medications:     Current Outpatient Medications:     buPROPion XL (Wellbutrin XL) 300 mg     aripiprazole 10 mg  (+certizine, fluticonasone, loratadine, pantoprazole, polymixin from others)        Interim Medical History and Review of Systems:  Saw PCP for bad sinuses earlier this week; told probably spring allergies and \"pressure\" so to increase antihistamines as above; also had eye irritation for which prescribed polymyxin B sulf-trimethoprin ophthalmic solution.     Record Review: moderate     Psychiatric Review Of Systems:   Depressive Symptoms: NA  Manic Symptoms: Less irritability; improving sleep habits; more thoughtful re: future planning  Anxiety Symptoms: Minimal  Disordered Eating Symptoms: NA  Inattentive Symptoms: improved with increased meds  Hyperactive/Impulsive Symptoms: See abpve  Trauma Symptoms: NA  Conduct Issues: NA  Psychotic Symptoms: NA  Developmental Concerns: NA  Other Symptoms/Concerns:NA  Delirium/Altered Mental Status Symptoms: NA        Objective   Mental Status Exam:  Patient is a well nourished, well developed young adult white male appearing to be approximately stated age  Appearance:   Well groomed, appropriately dressed   Attention:  Oriented X 3  Speech:  fluent, coherent, mildly pressured, no evidence for formal thought disorder      Process:  abstract; occasional concrete associations       Content:  without hallucinations, delusions,  denies SI, HI  Movements: No apparent gait " "disturbance or abnormal involuntary movements of face or trunk  Mood:  \"Good\"  Affect:  Euthymic  Cognition:   Grossly intact and appropriate to level of educational attainment  Judgement:   Fair  Insight:   Good         Vitals:  There were no vitals filed for this visit.     Summary of Psychotherapy Component:       Psychoeducation/Therapeutic Interventions during this visit:   __X_ Education re: symptoms, diagnosis and treatment options   __x_ Discussed physiologic factors that impact symptoms and stress   __X_ Discussed patterns of behavior and coping style that contribute to symptoms and presented alternative options   __X_ Identified cognitive distortions and presented alternative options   __X_ Reinforced boundaries of control and responsibility in symptom management   __X_ Education re: impact of alcohol/drugs on symptoms and discussed treatment resources/rationale   ___ Explored, identified, reinforced strategies and resources for coping with symptoms    __X_ Discussed medication options, risks, benefits with patient and/or family/guardian      Psychotherapeutic approach:  __X_Individual                          __X_Supportive          :     Other relevant content:  (see HPI for relevant details)     Time spent in therapy 20\"     Total time spent 30\"     Constantine Nj MD MPH  "

## 2024-05-10 ENCOUNTER — APPOINTMENT (OUTPATIENT)
Dept: OTOLARYNGOLOGY | Facility: HOSPITAL | Age: 24
End: 2024-05-10
Payer: MEDICAID

## 2024-05-21 ENCOUNTER — APPOINTMENT (OUTPATIENT)
Dept: PRIMARY CARE | Facility: CLINIC | Age: 24
End: 2024-05-21
Payer: MEDICAID

## 2024-05-22 ENCOUNTER — ANESTHESIA EVENT (OUTPATIENT)
Dept: OPERATING ROOM | Facility: HOSPITAL | Age: 24
End: 2024-05-22
Payer: MEDICAID

## 2024-05-23 ENCOUNTER — ANESTHESIA (OUTPATIENT)
Dept: OPERATING ROOM | Facility: HOSPITAL | Age: 24
End: 2024-05-23
Payer: MEDICAID

## 2024-05-23 ENCOUNTER — HOSPITAL ENCOUNTER (OUTPATIENT)
Facility: HOSPITAL | Age: 24
Setting detail: OUTPATIENT SURGERY
Discharge: HOME | End: 2024-05-23
Attending: OTOLARYNGOLOGY | Admitting: OTOLARYNGOLOGY
Payer: MEDICAID

## 2024-05-23 VITALS
OXYGEN SATURATION: 96 % | DIASTOLIC BLOOD PRESSURE: 74 MMHG | SYSTOLIC BLOOD PRESSURE: 116 MMHG | TEMPERATURE: 97.7 F | RESPIRATION RATE: 12 BRPM | HEART RATE: 72 BPM | WEIGHT: 220.46 LBS | BODY MASS INDEX: 26.84 KG/M2

## 2024-05-23 DIAGNOSIS — R59.1 LYMPHADENOPATHY: ICD-10-CM

## 2024-05-23 PROCEDURE — 7100000009 HC PHASE TWO TIME - INITIAL BASE CHARGE: Performed by: OTOLARYNGOLOGY

## 2024-05-23 PROCEDURE — 2720000007 HC OR 272 NO HCPCS: Performed by: OTOLARYNGOLOGY

## 2024-05-23 PROCEDURE — 7100000001 HC RECOVERY ROOM TIME - INITIAL BASE CHARGE: Performed by: OTOLARYNGOLOGY

## 2024-05-23 PROCEDURE — 88185 FLOWCYTOMETRY/TC ADD-ON: CPT | Mod: TC | Performed by: OTOLARYNGOLOGY

## 2024-05-23 PROCEDURE — 3600000007 HC OR TIME - EACH INCREMENTAL 1 MINUTE - PROCEDURE LEVEL TWO: Performed by: OTOLARYNGOLOGY

## 2024-05-23 PROCEDURE — 2500000005 HC RX 250 GENERAL PHARMACY W/O HCPCS: Mod: SE

## 2024-05-23 PROCEDURE — 88341 IMHCHEM/IMCYTCHM EA ADD ANTB: CPT | Mod: TC,SUR | Performed by: OTOLARYNGOLOGY

## 2024-05-23 PROCEDURE — 3700000002 HC GENERAL ANESTHESIA TIME - EACH INCREMENTAL 1 MINUTE: Performed by: OTOLARYNGOLOGY

## 2024-05-23 PROCEDURE — 3700000001 HC GENERAL ANESTHESIA TIME - INITIAL BASE CHARGE: Performed by: OTOLARYNGOLOGY

## 2024-05-23 PROCEDURE — 2500000004 HC RX 250 GENERAL PHARMACY W/ HCPCS (ALT 636 FOR OP/ED): Mod: SE

## 2024-05-23 PROCEDURE — 2500000004 HC RX 250 GENERAL PHARMACY W/ HCPCS (ALT 636 FOR OP/ED): Mod: SE | Performed by: OTOLARYNGOLOGY

## 2024-05-23 PROCEDURE — 38500 BIOPSY/REMOVAL LYMPH NODES: CPT | Performed by: OTOLARYNGOLOGY

## 2024-05-23 PROCEDURE — 7100000010 HC PHASE TWO TIME - EACH INCREMENTAL 1 MINUTE: Performed by: OTOLARYNGOLOGY

## 2024-05-23 PROCEDURE — 3600000002 HC OR TIME - INITIAL BASE CHARGE - PROCEDURE LEVEL TWO: Performed by: OTOLARYNGOLOGY

## 2024-05-23 PROCEDURE — A4217 STERILE WATER/SALINE, 500 ML: HCPCS | Mod: SE | Performed by: OTOLARYNGOLOGY

## 2024-05-23 PROCEDURE — 2500000005 HC RX 250 GENERAL PHARMACY W/O HCPCS: Mod: SE | Performed by: OTOLARYNGOLOGY

## 2024-05-23 PROCEDURE — 7100000002 HC RECOVERY ROOM TIME - EACH INCREMENTAL 1 MINUTE: Performed by: OTOLARYNGOLOGY

## 2024-05-23 RX ORDER — HYDROMORPHONE HYDROCHLORIDE 1 MG/ML
0.2 INJECTION, SOLUTION INTRAMUSCULAR; INTRAVENOUS; SUBCUTANEOUS EVERY 5 MIN PRN
Status: DISCONTINUED | OUTPATIENT
Start: 2024-05-23 | End: 2024-05-23 | Stop reason: HOSPADM

## 2024-05-23 RX ORDER — HYDROMORPHONE HYDROCHLORIDE 1 MG/ML
0.1 INJECTION, SOLUTION INTRAMUSCULAR; INTRAVENOUS; SUBCUTANEOUS EVERY 5 MIN PRN
Status: DISCONTINUED | OUTPATIENT
Start: 2024-05-23 | End: 2024-05-23 | Stop reason: HOSPADM

## 2024-05-23 RX ORDER — LIDOCAINE HYDROCHLORIDE AND EPINEPHRINE 10; 10 MG/ML; UG/ML
INJECTION, SOLUTION INFILTRATION; PERINEURAL AS NEEDED
Status: DISCONTINUED | OUTPATIENT
Start: 2024-05-23 | End: 2024-05-23 | Stop reason: HOSPADM

## 2024-05-23 RX ORDER — HYDROMORPHONE HYDROCHLORIDE 1 MG/ML
0.5 INJECTION, SOLUTION INTRAMUSCULAR; INTRAVENOUS; SUBCUTANEOUS EVERY 5 MIN PRN
Status: DISCONTINUED | OUTPATIENT
Start: 2024-05-23 | End: 2024-05-23 | Stop reason: HOSPADM

## 2024-05-23 RX ORDER — HYDRALAZINE HYDROCHLORIDE 20 MG/ML
5 INJECTION INTRAMUSCULAR; INTRAVENOUS EVERY 30 MIN PRN
Status: DISCONTINUED | OUTPATIENT
Start: 2024-05-23 | End: 2024-05-23 | Stop reason: HOSPADM

## 2024-05-23 RX ORDER — ONDANSETRON HYDROCHLORIDE 2 MG/ML
INJECTION, SOLUTION INTRAVENOUS AS NEEDED
Status: DISCONTINUED | OUTPATIENT
Start: 2024-05-23 | End: 2024-05-23

## 2024-05-23 RX ORDER — ONDANSETRON HYDROCHLORIDE 2 MG/ML
4 INJECTION, SOLUTION INTRAVENOUS ONCE AS NEEDED
Status: DISCONTINUED | OUTPATIENT
Start: 2024-05-23 | End: 2024-05-23 | Stop reason: HOSPADM

## 2024-05-23 RX ORDER — PHENYLEPHRINE HYDROCHLORIDE 10 MG/ML
INJECTION INTRAVENOUS AS NEEDED
Status: DISCONTINUED | OUTPATIENT
Start: 2024-05-23 | End: 2024-05-23

## 2024-05-23 RX ORDER — SODIUM CHLORIDE 0.9 G/100ML
IRRIGANT IRRIGATION AS NEEDED
Status: DISCONTINUED | OUTPATIENT
Start: 2024-05-23 | End: 2024-05-23 | Stop reason: HOSPADM

## 2024-05-23 RX ORDER — PROPOFOL 10 MG/ML
INJECTION, EMULSION INTRAVENOUS AS NEEDED
Status: DISCONTINUED | OUTPATIENT
Start: 2024-05-23 | End: 2024-05-23

## 2024-05-23 RX ORDER — CEFAZOLIN 1 G/1
INJECTION, POWDER, FOR SOLUTION INTRAVENOUS AS NEEDED
Status: DISCONTINUED | OUTPATIENT
Start: 2024-05-23 | End: 2024-05-23

## 2024-05-23 RX ORDER — LIDOCAINE HYDROCHLORIDE 10 MG/ML
0.1 INJECTION INFILTRATION; PERINEURAL ONCE
Status: DISCONTINUED | OUTPATIENT
Start: 2024-05-23 | End: 2024-05-23 | Stop reason: HOSPADM

## 2024-05-23 RX ORDER — LIDOCAINE HYDROCHLORIDE 10 MG/ML
INJECTION INFILTRATION; PERINEURAL AS NEEDED
Status: DISCONTINUED | OUTPATIENT
Start: 2024-05-23 | End: 2024-05-23

## 2024-05-23 RX ORDER — MIDAZOLAM HYDROCHLORIDE 1 MG/ML
INJECTION INTRAMUSCULAR; INTRAVENOUS CONTINUOUS PRN
Status: DISCONTINUED | OUTPATIENT
Start: 2024-05-23 | End: 2024-05-23

## 2024-05-23 RX ORDER — LABETALOL HYDROCHLORIDE 5 MG/ML
5 INJECTION, SOLUTION INTRAVENOUS ONCE AS NEEDED
Status: DISCONTINUED | OUTPATIENT
Start: 2024-05-23 | End: 2024-05-23 | Stop reason: HOSPADM

## 2024-05-23 RX ORDER — SODIUM CHLORIDE, SODIUM LACTATE, POTASSIUM CHLORIDE, CALCIUM CHLORIDE 600; 310; 30; 20 MG/100ML; MG/100ML; MG/100ML; MG/100ML
100 INJECTION, SOLUTION INTRAVENOUS CONTINUOUS
Status: DISCONTINUED | OUTPATIENT
Start: 2024-05-23 | End: 2024-05-23 | Stop reason: HOSPADM

## 2024-05-23 RX ORDER — SODIUM CHLORIDE, SODIUM LACTATE, POTASSIUM CHLORIDE, CALCIUM CHLORIDE 600; 310; 30; 20 MG/100ML; MG/100ML; MG/100ML; MG/100ML
INJECTION, SOLUTION INTRAVENOUS CONTINUOUS PRN
Status: DISCONTINUED | OUTPATIENT
Start: 2024-05-23 | End: 2024-05-23

## 2024-05-23 RX ORDER — ROCURONIUM BROMIDE 10 MG/ML
INJECTION, SOLUTION INTRAVENOUS AS NEEDED
Status: DISCONTINUED | OUTPATIENT
Start: 2024-05-23 | End: 2024-05-23

## 2024-05-23 RX ORDER — GLYCOPYRROLATE 0.2 MG/ML
INJECTION INTRAMUSCULAR; INTRAVENOUS AS NEEDED
Status: DISCONTINUED | OUTPATIENT
Start: 2024-05-23 | End: 2024-05-23

## 2024-05-23 RX ORDER — FENTANYL CITRATE 50 UG/ML
INJECTION, SOLUTION INTRAMUSCULAR; INTRAVENOUS AS NEEDED
Status: DISCONTINUED | OUTPATIENT
Start: 2024-05-23 | End: 2024-05-23

## 2024-05-23 RX ADMIN — PROPOFOL 400 MG: 10 INJECTION, EMULSION INTRAVENOUS at 16:43

## 2024-05-23 RX ADMIN — PHENYLEPHRINE HYDROCHLORIDE 80 MCG: 10 INJECTION INTRAVENOUS at 17:22

## 2024-05-23 RX ADMIN — LIDOCAINE HYDROCHLORIDE 100 MG: 10 INJECTION, SOLUTION INFILTRATION; PERINEURAL at 16:43

## 2024-05-23 RX ADMIN — PHENYLEPHRINE HYDROCHLORIDE 80 MCG: 10 INJECTION INTRAVENOUS at 17:14

## 2024-05-23 RX ADMIN — DEXAMETHASONE SODIUM PHOSPHATE 8 MG: 4 INJECTION INTRA-ARTICULAR; INTRALESIONAL; INTRAMUSCULAR; INTRAVENOUS; SOFT TISSUE at 16:43

## 2024-05-23 RX ADMIN — SUGAMMADEX 200 MG: 100 INJECTION, SOLUTION INTRAVENOUS at 17:17

## 2024-05-23 RX ADMIN — ONDANSETRON 4 MG: 2 INJECTION, SOLUTION INTRAMUSCULAR; INTRAVENOUS at 17:17

## 2024-05-23 RX ADMIN — GLYCOPYRROLATE 0.2 MG: 0.2 INJECTION, SOLUTION INTRAMUSCULAR; INTRAVENOUS at 17:29

## 2024-05-23 RX ADMIN — ROCURONIUM 50 MG: 50 INJECTION, SOLUTION INTRAVENOUS at 17:07

## 2024-05-23 RX ADMIN — SUGAMMADEX 200 MG: 100 INJECTION, SOLUTION INTRAVENOUS at 17:21

## 2024-05-23 RX ADMIN — SODIUM CHLORIDE, POTASSIUM CHLORIDE, SODIUM LACTATE AND CALCIUM CHLORIDE: 600; 310; 30; 20 INJECTION, SOLUTION INTRAVENOUS at 16:35

## 2024-05-23 RX ADMIN — CEFAZOLIN 2 G: 1 INJECTION, POWDER, FOR SOLUTION INTRAMUSCULAR; INTRAVENOUS at 16:51

## 2024-05-23 RX ADMIN — PROPOFOL 100 MG: 10 INJECTION, EMULSION INTRAVENOUS at 17:03

## 2024-05-23 RX ADMIN — PHENYLEPHRINE HYDROCHLORIDE 120 MCG: 10 INJECTION INTRAVENOUS at 17:26

## 2024-05-23 RX ADMIN — FENTANYL CITRATE 100 MCG: 50 INJECTION, SOLUTION INTRAMUSCULAR; INTRAVENOUS at 16:43

## 2024-05-23 RX ADMIN — PROPOFOL 100 MG: 10 INJECTION, EMULSION INTRAVENOUS at 16:51

## 2024-05-23 SDOH — HEALTH STABILITY: MENTAL HEALTH: CURRENT SMOKER: 1

## 2024-05-23 ASSESSMENT — PAIN - FUNCTIONAL ASSESSMENT
PAIN_FUNCTIONAL_ASSESSMENT: 0-10

## 2024-05-23 ASSESSMENT — PAIN SCALES - GENERAL
PAINLEVEL_OUTOF10: 0 - NO PAIN
PAIN_LEVEL: 0
PAINLEVEL_OUTOF10: 0 - NO PAIN

## 2024-05-23 ASSESSMENT — COLUMBIA-SUICIDE SEVERITY RATING SCALE - C-SSRS
6. HAVE YOU EVER DONE ANYTHING, STARTED TO DO ANYTHING, OR PREPARED TO DO ANYTHING TO END YOUR LIFE?: NO
1. IN THE PAST MONTH, HAVE YOU WISHED YOU WERE DEAD OR WISHED YOU COULD GO TO SLEEP AND NOT WAKE UP?: NO
2. HAVE YOU ACTUALLY HAD ANY THOUGHTS OF KILLING YOURSELF?: NO

## 2024-05-23 NOTE — ANESTHESIA POSTPROCEDURE EVALUATION
"Patient: Luciano Tejeda \"Feliciano\"    Procedure Summary       Date: 05/23/24 Room / Location: [facility] OR 04 / [facility]    Anesthesia Start: 1636 Anesthesia Stop: 1801    Procedure: Biopsy Lymph Node Head/Neck (Right) Diagnosis:       Lymphadenopathy      (Lymphadenopathy [R59.1])    Surgeons: Quan Navas MD Responsible Provider: Jules Miller MD    Anesthesia Type: general ASA Status: 2            Anesthesia Type: No value filed.    Vitals Value Taken Time   /70 05/23/24 1758   Temp 36.3 05/23/24 1802   Pulse 74 05/23/24 1800   Resp 11 05/23/24 1800   SpO2 98 % 05/23/24 1800   Vitals shown include unfiled device data.    Anesthesia Post Evaluation    Patient location during evaluation: PACU  Patient participation: complete - patient participated  Level of consciousness: awake and alert  Pain score: 0  Pain management: adequate  Airway patency: patent  Cardiovascular status: acceptable  Respiratory status: acceptable  Hydration status: acceptable  Postoperative Nausea and Vomiting: none        No notable events documented.    "

## 2024-05-23 NOTE — DISCHARGE INSTRUCTIONS
[facility] DEPARTMENT OF OTOLARYNGOLOGY (ENT):  LYMPH NODE BIOPSY POST-OPERATIVE INSTRUCTIONS    Operations performed:   Right neck lymph node biopsy    Treatment/wound care:   Keep area(s) clean and dry.   It is okay to shower 48 hours after time of surgery.    Do not scrub wound(s), pat dry.    Do not submerge wound(s) in standing water until seen in followup (no tub bathing, swimming, or hot tubs).    Please visually inspect your wound(s) at least once daily.  If the wound(s) are in a difficult to see location, please use a mirror or have someone else assist with visual inspection.  The incision has steri-strips on the wound, do not remove these strips. They will fall off on their own. You have stitches that will dissolve on their own.    If you have sutures that you can see outside of the skin or staples:  Please follow-up in 14 days for a post-op evaluation. Return sooner or call if wound(s) or surrounding area have increased swelling, pain, warmth, redness, or drainage that is thick, yellow and/or green.    If you see white bandages on your neck:  You have steri strips (small paper tape) along your incision. You can shower, pat dry; do not soak in a tub.  The steri strips will fall off on their own; do not peel them off.    Expected Post-Surgical Symptoms:  You may experience neck pain and a hoarse/weak voice. These symptoms are often temporary and may be due to irritation from the breathing tube that's inserted during surgery. Swallowing difficulties due to pain for several days.     Pain Control:    Adequate management:   Over-the-counter tylenol can be taken as prescribed as needed for pain.      Activity after Discharge:   When you go home, you can usually return to your regular activities.  Avoid strenuous activities, such as bicycle riding, jogging, weight lifting, or aerobic exercise, for at least 2 weeks.   No travelling for at least 7 days following surgery.  Do not drive or operate heavy  machinery while taking narcotic pain medications as these medications can alter perception, impair judgement, and slow reaction times.    Diet: resume normal diet    Additional Instructions:   Medicines  DO NOT take blood thinners, such as warfarin (Coumadin), clopidogrel (Plavix), or aspirin until instructed to do so from your surgeon.   Do not take aspirin, medicines that contain aspirin, or non-steroidal anti-inflammatory medicines such as ibuprofen (Advil, Motrin) or naproxen (Aleve) for 2 weeks after surgery unless otherwise directed by your doctor.  Take pain medicines exactly as directed.   If you are prescribed antibiotics, take them as directed. Do not stop taking them just because you feel better.

## 2024-05-23 NOTE — ANESTHESIA PREPROCEDURE EVALUATION
"Patient: Luciano Tejeda \"Feliciano\"    Procedure Information       Date/Time: 05/23/24 1230    Procedure: Biopsy Lymph Node Head/Neck (Left)    Location: [facility] OR 04 / [facility] OR    Surgeons: Quan Navas MD            Relevant Problems   Neuro   (+) Anxiety   (+) Bipolar 2 disorder (Multi)   (+) Bipolar II disorder (Multi)      HEENT   (+) Seasonal allergies       Clinical information reviewed:   Tobacco  Allergies  Meds   Med Hx  Surg Hx   Fam Hx  Soc Hx        NPO Detail:  NPO/Void Status  Carbohydrate Drink Given Prior to Surgery? : N  Date of Last Liquid: 05/22/24  Time of Last Liquid: 2310  Date of Last Solid: 05/22/24  Time of Last Solid: 2310  Last Intake Type: Clear fluids; Light meal  Time of Last Void: 1200         Physical Exam    Airway  Mallampati: I  TM distance: >3 FB  Neck ROM: full     Cardiovascular - normal exam  Rhythm: regular  Rate: normal     Dental - normal exam     Pulmonary - normal exam  Breath sounds clear to auscultation     Abdominal   Abdomen: soft             Anesthesia Plan    History of general anesthesia?: yes  History of complications of general anesthesia?: no    ASA 2     general     The patient is a current smoker.  Patient did not smoke on day of procedure.    intravenous induction   Trial extubation is planned.  Anesthetic plan and risks discussed with patient.  Use of blood products discussed with patient and mother who consented to blood products.    Plan discussed with attending.      "

## 2024-05-23 NOTE — ANESTHESIA PROCEDURE NOTES
Airway  Date/Time: 5/23/2024 4:40 PM  Urgency: elective    Airway not difficult    Staffing  Performed: resident   Authorized by: Jules Miller MD    Performed by: Any Bains MD  Patient location during procedure: OR    Indications and Patient Condition  Indications for airway management: anesthesia  Spontaneous ventilation: present  Sedation level: deep  Preoxygenated: yes  Patient position: sniffing  Mask difficulty assessment: 1 - vent by mask    Final Airway Details  Final airway type: supraglottic airway      Successful airway: classic  Size 4     Number of attempts at approach: 2  Number of other approaches attempted: 0

## 2024-05-24 NOTE — OP NOTE
OPERATIVE NOTE     Date:  2024 OR Location: [facility] OR    Name: Luciano Tejeda : 2000, Age: 24 y.o., MRN: [id number], Sex: male      Surgeons   Quan Navas MD    Resident/Fellow/Other Assistant:  Milady Deluca MD    Anesthesia: General  ASA: ASA status not filed in the log.  Anesthesia Staff: Anesthesiologist: Jules Miller MD  Anesthesia Resident: Any Bains MD  Staff: Circulator: Fara Hook RN; Jeramy Eller RN  Relief Scrub: Daryn Fraire RN  Scrub Person: Tori Dove RN      Preoperative Diagnosis:  Cervical lymphadenopathy    Postoperative Diagnosis:  Same    Procedure:  Right excisional lymph node biopsy    Findings:  2 cm right level 2 node    Estimated Blood Loss:  5    Implantables/Drains:  none    Complications:  None    Description of Procedure:  This patient is a 25 yo man who presents for evaluation of cervical lymphadenopathy. He has some borderline enlarged nodes, right neck largest at 1.8 cm, left with smaller ones. I didn't necessarily think these were more than reactive especially since the scan showed some sinusitis at the time. However his mom was very worried because he had night sweats, weight loss and fatigue. His father also had lymphoma and she felt these presentations were similar. We initially discussed biopsying a palpable level V node on the left however in the PACU this was no longer palpable. It was smaller anyways than the right neck node.     The patient was met in the preoperative area and informed consent was confirmed after discussing the risks, benefits, and alternatives of the procedure. The patient was then brought to the operating room and transferred to the table. A preoperative huddle was performed, confirming the correct patient, procedure, laterality, and allergies. The patient was induced under general anesthesia and turned towards the surgical team.     We marked out a 3 cm incision under the submandibular gland in neck.  This was anesthetized with 1% lidocaine with 1:100,000 epinephrine. The patient was then prepped and draped in a sterile fashion. We made initial incision with a 15 blade. Then we dissected down through the platysma with a Bovie. We dissected anterior to the SCM to find the facial vein. A large 2 cm node was seen. This was removed. The pedicle into the node was clipped and divided. Hemostasis was achieved. The wound was irrigated.     Some fibrillar was placed. The incision was closed in layers using 3-0 vicryl and 4-0 monocryl. Mastisol and steri strips were placed.  Patient was then returned to anesthesia for extubation.    I was present for the critical portions of the procedure.

## 2024-05-28 LAB
CELL COUNT (BLOOD): 17.9 X10*3/UL
CELL POPULATIONS: NORMAL
DIAGNOSIS: NORMAL
FLOW DIFFERENTIAL: NORMAL
FLOW TEST ORDERED: NORMAL
LAB TEST METHOD: NORMAL
NUMBER OF CELLS COLLECTED: NORMAL PER TUBE
PATH REPORT.TOTAL CANCER: NORMAL
SIGNATURE COMMENT: NORMAL
SPECIMEN VIABILITY: NORMAL

## 2024-05-30 DIAGNOSIS — F90.2 ATTENTION DEFICIT HYPERACTIVITY DISORDER (ADHD), COMBINED TYPE: ICD-10-CM

## 2024-05-30 DIAGNOSIS — F31.81 BIPOLAR 2 DISORDER (MULTI): ICD-10-CM

## 2024-05-30 LAB
LAB AP ASR DISCLAIMER: NORMAL
LABORATORY COMMENT REPORT: NORMAL
PATH REPORT.FINAL DX SPEC: NORMAL
PATH REPORT.GROSS SPEC: NORMAL
PATH REPORT.RELEVANT HX SPEC: NORMAL
PATH REPORT.TOTAL CANCER: NORMAL

## 2024-06-12 ENCOUNTER — APPOINTMENT (OUTPATIENT)
Dept: BEHAVIORAL HEALTH | Facility: CLINIC | Age: 24
End: 2024-06-12
Payer: MEDICAID

## 2024-06-12 DIAGNOSIS — F31.81 BIPOLAR 2 DISORDER (MULTI): ICD-10-CM

## 2024-06-12 DIAGNOSIS — F41.9 ANXIETY: ICD-10-CM

## 2024-06-12 DIAGNOSIS — F19.10 SUBSTANCE ABUSE (MULTI): ICD-10-CM

## 2024-06-12 PROCEDURE — 99214 OFFICE O/P EST MOD 30 MIN: CPT | Performed by: PSYCHIATRY & NEUROLOGY

## 2024-06-12 RX ORDER — ESCITALOPRAM OXALATE 10 MG/1
10 TABLET ORAL DAILY
Qty: 30 TABLET | Refills: 2 | Status: SHIPPED | OUTPATIENT
Start: 2024-06-12 | End: 2024-09-10

## 2024-06-12 NOTE — PROGRESS NOTES
"Outpatient Psychiatry - Followup Medication+Psychotherapy     Location of service:  __X_ Office       ___ A/V            ___Telephone (3 factor ID completed)        Subjective   Luciano Tejeda, a 24 y.o. male, for followup visit.        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             Codes     Bipolar 2 disorder (CMS/[facility])     F31.81     Relevant Medications       Abilify (Aripiprazole) 5 mg            Treatment Goals:    Anxiety: acquiring relapse prevention skills, reducing negative automatic thoughts, and reducing physical symptoms of anxiety  Drug & Alcohol: reducing/eliminating substance use  Bipolar II:  Reducing;/eliminating mood swings, reducing/eliminating spending sprees, gambling, irritability  R/O ADHD:  Improve concentration/focus, reduce impulsivity - COMPLETED - no ADHD     Treatment Plan/Recommendations:   Continue Aripiprazole to 5 mg daily  Start Escitalopram (Lexapro) 10 mg daily  Continue with therapist  Return to clinic 4 weeks       Follow-up plan was discussed with patient.        Review with patient: Treatment plan reviewed with the patient.  Medication risks/benefit reviewed with the patient        HPI:    Patient returns for followup; Mom  joins fat beginning to give me \"her view:\"  She says he lacks motivation, sleeps late, not registering for classes or following up with school counselor, went to Gym yesterday but didn't want to play basketball  Had minor surgery 2 weeks ago - 3 cm lymph node in neck - biopsy negative.  Worry was non-Hodgkins Lymphoma which Dad had; same symptoms, tired all the time, muscle and joint aches, large lymph node, then developed nosebleeds...those have resolved.  They think since biopsy negative there is no concern about lymphoma but I advised to keep followup " "appointments; apparently scheduled with Rheumatology to evaluate for autoimmune disorder.   Plans visit with Dad in next few weeks; if not back for next appt will let me know and set up tele-visit     Current Medications:     Current Outpatient Medications:     aripiprazole 5 mg  (+certizine, fluticonasone, loratadine, pantoprazole, polymixin from others)        Interim Medical History and Review of Systems:  See above:  Concern re: possible lymphoma but LN biopsy negative     Record Review: moderate     Psychiatric Review Of Systems:   Depressive Symptoms: mild  Manic Symptoms: Less irritability; improving sleep habits; more thoughtful re: future planning  Anxiety Symptoms: Minimal  Disordered Eating Symptoms: NA  Inattentive Symptoms: improved with increased meds  Hyperactive/Impulsive Symptoms: See abpve  Trauma Symptoms: NA  Conduct Issues: NA  Psychotic Symptoms: NA  Developmental Concerns: NA  Other Symptoms/Concerns:NA  Delirium/Altered Mental Status Symptoms: NA        Objective   Mental Status Exam:  Patient is a well nourished, well developed young adult white male appearing to be approximately stated age  Appearance:   Well groomed, appropriately dressed   Attention:  Oriented X 3  Speech:  fluent, coherent, no evidence for formal thought disorder      Process:  abstract; occasional concrete associations       Content:  without hallucinations, delusions,  denies SI, HI  Movements: No apparent gait disturbance or abnormal involuntary movements of face or trunk  Mood:  \"about even\"  Affect:  Euthymic  Cognition:   Grossly intact and appropriate to level of educational attainment  Judgement:   Fair  Insight:   Good         Vitals:  There were no vitals filed for this visit.     Summary of Psychotherapy Component:       Psychoeducation/Therapeutic Interventions during this visit:   __X_ Education re: symptoms, diagnosis and treatment options   __x_ Discussed physiologic factors that impact symptoms and stress " "  __X_ Discussed patterns of behavior and coping style that contribute to symptoms and presented alternative options   __X_ Identified cognitive distortions and presented alternative options   __X_ Reinforced boundaries of control and responsibility in symptom management   __X_ Education re: impact of alcohol/drugs on symptoms and discussed treatment resources/rationale   ___ Explored, identified, reinforced strategies and resources for coping with symptoms    __X_ Discussed medication options, risks, benefits with patient and/or family/guardian      Psychotherapeutic approach:  __X_Individual                          __X_Supportive          :     Other relevant content:  (see HPI for relevant details)     Time spent in therapy 20\"     Total time spent 30\"     Constantine Nj MD MPH  "

## 2024-06-14 ENCOUNTER — OFFICE VISIT (OUTPATIENT)
Dept: OTOLARYNGOLOGY | Facility: HOSPITAL | Age: 24
End: 2024-06-14
Payer: MEDICAID

## 2024-06-14 VITALS — HEIGHT: 76 IN | TEMPERATURE: 98.1 F | BODY MASS INDEX: 27.58 KG/M2 | WEIGHT: 226.5 LBS

## 2024-06-14 DIAGNOSIS — R59.0 CERVICAL LYMPHADENOPATHY: Primary | ICD-10-CM

## 2024-06-14 PROCEDURE — 99024 POSTOP FOLLOW-UP VISIT: CPT | Performed by: OTOLARYNGOLOGY

## 2024-06-14 PROCEDURE — 1036F TOBACCO NON-USER: CPT | Performed by: OTOLARYNGOLOGY

## 2024-06-14 ASSESSMENT — PATIENT HEALTH QUESTIONNAIRE - PHQ9
SUM OF ALL RESPONSES TO PHQ9 QUESTIONS 1 & 2: 1
2. FEELING DOWN, DEPRESSED OR HOPELESS: NOT AT ALL
1. LITTLE INTEREST OR PLEASURE IN DOING THINGS: SEVERAL DAYS
10. IF YOU CHECKED OFF ANY PROBLEMS, HOW DIFFICULT HAVE THESE PROBLEMS MADE IT FOR YOU TO DO YOUR WORK, TAKE CARE OF THINGS AT HOME, OR GET ALONG WITH OTHER PEOPLE: SOMEWHAT DIFFICULT

## 2024-06-14 NOTE — PROGRESS NOTES
"New Patient Visit  This is a 24 year old male who presents with a one year history of painless lymphadenopathy and night sweats. Of note, his father has a history of Hodgkins lymphoma which was diagnosed at 25. He denies any fevers, shortness of breath, or weight loss but states that he feels chronically fatigued and has suffered from recurrent upper respiratory infections. He has been treated with multiple rounds of antibiotics without complete resolution of symptoms. He had a CT which showed bilateral cervical lymphadenopathy and signs of sinusitis. He was treated with antibiotics and nasal sprays which resolved his sinonasal symptoms. Labs have been normal without cell count abnormalities aside from a positive MERRICK. He denies any foreign travel, new vaccines, or exposure to cat scratches or exotic animals.     6-14-24 post-op: s/p excisional lymph node biopsy. Doing well, no pain.     Past Medical History  He has a past medical history of Anxiety, Depression, and GERD (gastroesophageal reflux disease).    Surgical History  He has a past surgical history that includes Finger surgery.     Social History  He reports that he has never smoked. He has quit using smokeless tobacco. He reports current alcohol use. He reports that he does not currently use drugs after having used the following drugs: Marijuana.    Family History  Family History   Problem Relation Name Age of Onset    Hodgkin's lymphoma Father      Alcohol abuse Father          Allergies  Patient has no known allergies.    Review of Systems  See above    Objective  PHYSICAL EXAMINATION:  Nad alert  Tessie eomi NCAT  Right neck incision healing well, mild erythema just along the scar, small end of stitch was removed  No hematoma or infection     Last Recorded Vitals  Temperature 36.7 °C (98.1 °F), height 1.93 m (6' 4\"), weight 103 kg (226 lb 8 oz).    Assessment/Plan   This is a 24 year old male who presented with a one year history of persistent cervical " lymphadenopathy. His exam / scan is not particularly concerning however his mom seems very worried due to the family history.  S/p excisional lymph node biopsy  Reviewed path - reactive node, no malignancy    -healing well  -cont vaseline, using some mederma cream as well; discussed importance of sunscreen for the scar  -follow up as needed    Quan Navas MD

## 2024-06-19 RX ORDER — ARIPIPRAZOLE 5 MG/1
5 TABLET ORAL DAILY
Qty: 90 TABLET | Refills: 1 | OUTPATIENT
Start: 2024-06-19

## 2024-06-19 RX ORDER — BUPROPION HYDROCHLORIDE 150 MG/1
150 TABLET ORAL
Qty: 90 TABLET | Refills: 4 | OUTPATIENT
Start: 2024-06-19

## 2024-06-24 ENCOUNTER — OFFICE VISIT (OUTPATIENT)
Dept: PRIMARY CARE | Facility: CLINIC | Age: 24
End: 2024-06-24
Payer: MEDICAID

## 2024-06-24 VITALS
SYSTOLIC BLOOD PRESSURE: 120 MMHG | BODY MASS INDEX: 27.28 KG/M2 | DIASTOLIC BLOOD PRESSURE: 66 MMHG | WEIGHT: 224 LBS | HEIGHT: 76 IN

## 2024-06-24 DIAGNOSIS — L02.92 BOIL: Primary | ICD-10-CM

## 2024-06-24 PROCEDURE — 99213 OFFICE O/P EST LOW 20 MIN: CPT | Performed by: STUDENT IN AN ORGANIZED HEALTH CARE EDUCATION/TRAINING PROGRAM

## 2024-06-24 RX ORDER — DOXYCYCLINE 100 MG/1
100 CAPSULE ORAL 2 TIMES DAILY
Qty: 14 CAPSULE | Refills: 0 | Status: SHIPPED | OUTPATIENT
Start: 2024-06-24 | End: 2024-07-01

## 2024-06-24 ASSESSMENT — ENCOUNTER SYMPTOMS
WOUND: 1
NEUROLOGICAL NEGATIVE: 1
RESPIRATORY NEGATIVE: 1
CONSTITUTIONAL NEGATIVE: 1
MUSCULOSKELETAL NEGATIVE: 1
CARDIOVASCULAR NEGATIVE: 1
GASTROINTESTINAL NEGATIVE: 1
PSYCHIATRIC NEGATIVE: 1

## 2024-06-24 NOTE — PROGRESS NOTES
"Subjective   Patient ID: Feliciano Tejeda is a 24 y.o. male who presents for Mass (\"Boil\", x 1 week, right side of groin).    Patient seen on sick visit.  Regards that he had an area of red around his groin that was fairly tender to palpitation going on for the past week or so.  Unfortunately, did have an episode where it burst this morning and now is starting to have some drainage of blood.  States no fevers or chills or abnormal discharge.  Regards no new sexual encounters.  Otherwise, states he is overall doing well.         Review of Systems   Constitutional: Negative.    HENT: Negative.     Respiratory: Negative.     Cardiovascular: Negative.    Gastrointestinal: Negative.    Musculoskeletal: Negative.    Skin:  Positive for rash and wound.   Neurological: Negative.    Psychiatric/Behavioral: Negative.         Objective   /66   Ht 1.93 m (6' 4\")   Wt 102 kg (224 lb)   BMI 27.27 kg/m²     Physical Exam  Constitutional:       General: He is not in acute distress.     Appearance: He is not ill-appearing.   Eyes:      Pupils: Pupils are equal, round, and reactive to light.   Cardiovascular:      Rate and Rhythm: Normal rate and regular rhythm.      Pulses: Normal pulses.      Heart sounds: No murmur heard.  Pulmonary:      Effort: No respiratory distress.      Breath sounds: No wheezing.   Abdominal:      General: Abdomen is flat. Bowel sounds are normal. There is no distension.   Genitourinary:     Comments: Tenderness around R groin noted drainage, non purulent  Musculoskeletal:      Right lower leg: No edema.      Left lower leg: No edema.   Skin:     General: Skin is warm and dry.   Neurological:      Mental Status: He is alert. Mental status is at baseline.      Cranial Nerves: No cranial nerve deficit.      Motor: No weakness.   Psychiatric:         Mood and Affect: Mood normal.         Behavior: Behavior normal.         Assessment/Plan   Problem List Items Addressed This Visit    None  Visit Diagnoses  "        Codes    Boil    -  Primary L02.92    Relevant Medications    doxycycline (Vibramycin) 100 mg capsule             Patient seen on sick visit    #Boil  Noted on exam, and perianal area, nonpurulent drainage recommend doxycycline  twice daily.  He will call if symptoms worsen he is agreeable with this plan    Return to care as previous scheduled or as needed

## 2024-06-24 NOTE — PROGRESS NOTES
"Subjective   Patient ID: Feliciano Tejeda is a 24 y.o. male who presents for Mass (\"Boil\", x 1 week, right side of groin).    HPI     Review of Systems    Objective   There were no vitals taken for this visit.    Physical Exam    Assessment/Plan   {Assess/PlanSmartLinks:73641}       "

## 2024-07-04 DIAGNOSIS — F31.81 BIPOLAR 2 DISORDER (MULTI): ICD-10-CM

## 2024-07-17 ENCOUNTER — APPOINTMENT (OUTPATIENT)
Dept: BEHAVIORAL HEALTH | Facility: CLINIC | Age: 24
End: 2024-07-17
Payer: MEDICAID

## 2024-07-17 DIAGNOSIS — F43.23 ADJUSTMENT DISORDER WITH MIXED ANXIETY AND DEPRESSED MOOD: ICD-10-CM

## 2024-07-17 DIAGNOSIS — F19.10 SUBSTANCE ABUSE (MULTI): ICD-10-CM

## 2024-07-17 DIAGNOSIS — F31.81 BIPOLAR 2 DISORDER (MULTI): ICD-10-CM

## 2024-07-17 DIAGNOSIS — F41.9 ANXIETY: ICD-10-CM

## 2024-07-17 PROCEDURE — 99214 OFFICE O/P EST MOD 30 MIN: CPT | Performed by: PSYCHIATRY & NEUROLOGY

## 2024-07-17 RX ORDER — ARIPIPRAZOLE 5 MG/1
5 TABLET ORAL DAILY
Qty: 30 TABLET | Refills: 2 | Status: SHIPPED | OUTPATIENT
Start: 2024-07-17 | End: 2024-10-15

## 2024-07-17 NOTE — PROGRESS NOTES
"Outpatient Psychiatry - Followup Medication+Psychotherapy     Location of service:  __X_ Office       ___ A/V            ___Telephone (3 factor ID completed)        Subjective   Luciano Tejeda, a 24 y.o. male, for followup visit.        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             Codes     Bipolar 2 disorder (CMS/[facility])     F31.81     Relevant Medications        Abilify (Aripiprazole) 5 mg      Bupropion  mg         Treatment Goals:    Anxiety: acquiring relapse prevention skills, reducing negative automatic thoughts, and reducing physical symptoms of anxiety  Drug & Alcohol: reducing/eliminating substance use  Bipolar II:  Reducing;/eliminating mood swings, reducing/eliminating spending sprees, gambling, irritability       Treatment Plan/Recommendations:   Continue Aripiprazole to 5 mg daily  Discontinue escitalopram  Restart Bupropion  mg - consider Venlafaxine if not effective  Continue with therapist  Return to clinic 4 weeks        Follow-up plan was discussed with patient.        Review with patient: Treatment plan reviewed with the patient.  Medication risks/benefit reviewed with the patient        HPI:    Patient returns for followup; Mom also comments at beginning and end.   She says \"Medicine didn't work;\" has gained weight, motivation seems worse, sleeps late, not registering for classes or following up with school counselor.  Concerned he will be going with brother to visit father next 10 days and \"he's going to make them work.\"  Neck LN biopsy was negative  He says tired of work, goes late and nothing is said, hates to even think it's coming up the next day or even two days later.  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 - counselor told him about a good " "job in [place] but he has no transportation     Current Medications:     Current Outpatient Medications:     aripiprazole 5 mg  Lexapro 20 mg (ineffective)  (+certizine, fluticonasone, loratadine, pantoprazole, polymixin from others)        Interim Medical History and Review of Systems:  See above:  Concern re: possible lymphoma but LN biopsy negative     Record Review: moderate     Psychiatric Review Of Systems:   Depressive Symptoms: mild  Manic Symptoms: More irritability, declining sleep habits; smoking and vaping again; not taking action towards future goals (e.g. going to veteranary school)  Anxiety Symptoms: Minimal  Disordered Eating Symptoms: NA  Inattentive Symptoms: improved with increased meds  Hyperactive/Impulsive Symptoms: See abpve  Trauma Symptoms: NA  Conduct Issues: NA  Psychotic Symptoms: NA  Developmental Concerns: NA  Other Symptoms/Concerns:NA  Delirium/Altered Mental Status Symptoms: NA        Objective   Mental Status Exam:  Patient is a well nourished, well developed young adult white male appearing to be approximately stated age  Appearance:   Well groomed, appropriately dressed   Attention:  Oriented X 3  Speech:  fluent, coherent, no evidence for formal thought disorder      Process:  abstract; occasional concrete associations       Content:  without hallucinations, delusions,  denies SI, HI  Movements: No apparent gait disturbance or abnormal involuntary movements of face or trunk  Mood:  \"okay\"  Affect:  Euthymic  Cognition:   Grossly intact and appropriate to level of educational attainment  Judgement:   Fair  Insight:   Good         Vitals:  There were no vitals filed for this visit.     Summary of Psychotherapy Component:       Psychoeducation/Therapeutic Interventions during this visit:   __X_ Education re: symptoms, diagnosis and treatment options   __x_ Discussed physiologic factors that impact symptoms and stress   __X_ Discussed patterns of behavior and coping style that " "contribute to symptoms and presented alternative options   __X_ Identified cognitive distortions and presented alternative options   __X_ Reinforced boundaries of control and responsibility in symptom management   __X_ Education re: impact of alcohol/drugs on symptoms and discussed treatment resources/rationale   ___ Explored, identified, reinforced strategies and resources for coping with symptoms    __X_ Discussed medication options, risks, benefits with patient and/or family/guardian      Psychotherapeutic approach:  __X_Individual                          __X_Supportive          :     Other relevant content:  (see HPI for relevant details)     Time spent in therapy 20\"     Total time spent 30\"  "

## 2024-08-14 ENCOUNTER — APPOINTMENT (OUTPATIENT)
Dept: BEHAVIORAL HEALTH | Facility: CLINIC | Age: 24
End: 2024-08-14
Payer: MEDICAID

## 2024-08-14 DIAGNOSIS — F41.9 ANXIETY: ICD-10-CM

## 2024-08-14 DIAGNOSIS — F90.2 ATTENTION DEFICIT HYPERACTIVITY DISORDER (ADHD), COMBINED TYPE: ICD-10-CM

## 2024-08-14 DIAGNOSIS — F31.81 BIPOLAR 2 DISORDER (MULTI): ICD-10-CM

## 2024-08-14 DIAGNOSIS — F19.10 SUBSTANCE ABUSE (MULTI): ICD-10-CM

## 2024-08-14 PROCEDURE — 99214 OFFICE O/P EST MOD 30 MIN: CPT | Performed by: PSYCHIATRY & NEUROLOGY

## 2024-08-14 RX ORDER — VENLAFAXINE HYDROCHLORIDE 150 MG/1
150 CAPSULE, EXTENDED RELEASE ORAL DAILY
Qty: 30 CAPSULE | Refills: 2 | Status: SHIPPED | OUTPATIENT
Start: 2024-08-14 | End: 2024-11-12

## 2024-08-14 NOTE — PROGRESS NOTES
"Outpatient Psychiatry - Followup Medication Management     Location of service:  __X_ Office       ___ A/V            ___Telephone (3 factor ID completed)        Subjective   Luciano Tejeda, a 24 y.o. male, for followup visit.        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             Codes     Bipolar 2 disorder (CMS/[facility])     F31.81     Relevant Medications        Abilify (Aripiprazole) 5 mg      Bupropion  mg (Stopping; ineffective)     Start Venlafaxine  mg        Treatment Goals:    Anxiety: acquiring relapse prevention skills, reducing negative automatic thoughts, and reducing physical symptoms of anxiety  Drug & Alcohol: reducing/eliminating substance use  Bipolar II:  Reducing;/eliminating mood swings, reducing/eliminating spending sprees, gambling, irritability        Treatment Plan/Recommendations:   Continue Aripiprazole to 5 mg daily  Discontinue Bupropion  Start Venlafaxine   Continue with therapist  Followup (virtual)  4 weeks - will schedule 2 virtual for each 1 in-person per patient request (starting school and hopefully new job)        Follow-up plan was discussed with patient.        Review with patient: Treatment plan reviewed with the patient.  Medication risks/benefit reviewed with the patient        HPI:    Patient returns for followup; Mom also comments at beginning and end.   Mom again says meds don't work; spends all day shira and sleeping.  No energy or motivation; not checking emails and per her report missed an email offering him a full scholarship (?  He says it was a solicitation to apply, not a guarantee).   He says \"smoking and sleeping, smoking and sleeping...\"   Admits doing more shira, more irritable, sometimes wants brother to join him upstairs to keep company.  Met a " "woman at a bar who he has gone out with a few times; Mom says they broke up, he says they did not, their schedules are just not meshing yet but he will see her this Friday.  Hoping to get job with a vet; sees Arsen (counselor) tomorrow who suggested a position for him to check out as a / for a vet clinic in [place]???    Does think the Abilify helps, has skipped on occasions and finds he is more irritable (angry); does not think Wellbutrin is doing anything.        Current Medications:     Current Outpatient Medications:     aripiprazole 5 mg  Bupropion 150 mg (ineffective)  (+certizine, fluticonasone, loratadine, pantoprazole, polymixin from others)        Interim Medical History and Review of Systems:  No changes     Record Review: moderate     Psychiatric Review Of Systems:   Depressive Symptoms: mild  Manic (?) Symptoms: More irritability, sleep is okay; smoking and vaping faily; not taking action towards future goals (e.g. going to veteranary school)  Anxiety Symptoms: Minimal  Disordered Eating Symptoms: NA  Inattentive Symptoms: improved with increased meds  Hyperactive/Impulsive Symptoms: See abpve  Trauma Symptoms: NA  Conduct Issues: NA  Psychotic Symptoms: NA  Developmental Concerns: NA  Other Symptoms/Concerns:NA  Delirium/Altered Mental Status Symptoms: NA        Objective   Mental Status Exam:  Patient is a well nourished, well developed young adult white male appearing to be approximately stated age  Appearance:   Well groomed, appropriately dressed   Attention:  Oriented X 3  Speech:  fluent, coherent, no evidence for formal thought disorder      Process:  abstract; occasional concrete associations       Content:  without hallucinations, delusions,  denies SI, HI  Movements: No apparent gait disturbance or abnormal involuntary movements of face or trunk  Mood:  \"okay\"  Affect:  Euthymic  Cognition:   Grossly intact and appropriate to level of educational attainment  Judgement:   " "Fair  Insight:   Good         Vitals:  There were no vitals filed for this visit.     Summary of Psychotherapy Component:       Psychoeducation/Therapeutic Interventions during this visit:   __X_ Education re: symptoms, diagnosis and treatment options   __x_ Discussed physiologic factors that impact symptoms and stress   __X_ Discussed patterns of behavior and coping style that contribute to symptoms and presented alternative options   __X_ Identified cognitive distortions and presented alternative options   __X_ Reinforced boundaries of control and responsibility in symptom management   __X_ Education re: impact of alcohol/drugs on symptoms and discussed treatment resources/rationale   ___ Explored, identified, reinforced strategies and resources for coping with symptoms    __X_ Discussed medication options, risks, benefits with patient and/or family/guardian      Psychotherapeutic approach:  __X_Individual                          __X_Supportive          :     Other relevant content:  (see HPI for relevant details)     Time spent in therapy 20\"     Total time spent 30\"  "

## 2024-08-27 ENCOUNTER — APPOINTMENT (OUTPATIENT)
Dept: PRIMARY CARE | Facility: CLINIC | Age: 24
End: 2024-08-27
Payer: MEDICAID

## 2024-08-27 VITALS
SYSTOLIC BLOOD PRESSURE: 104 MMHG | WEIGHT: 226 LBS | HEART RATE: 74 BPM | OXYGEN SATURATION: 96 % | HEIGHT: 76 IN | BODY MASS INDEX: 27.52 KG/M2 | DIASTOLIC BLOOD PRESSURE: 65 MMHG

## 2024-08-27 DIAGNOSIS — R59.1 LYMPHADENOPATHY: Primary | ICD-10-CM

## 2024-08-27 PROCEDURE — 3008F BODY MASS INDEX DOCD: CPT | Performed by: STUDENT IN AN ORGANIZED HEALTH CARE EDUCATION/TRAINING PROGRAM

## 2024-08-27 PROCEDURE — 99213 OFFICE O/P EST LOW 20 MIN: CPT | Performed by: STUDENT IN AN ORGANIZED HEALTH CARE EDUCATION/TRAINING PROGRAM

## 2024-08-27 NOTE — PROGRESS NOTES
"Subjective   Patient ID: Feliciano Tejeda is a 24 y.o. male who presents for Follow-up (Pt is here for F/U on lymph nodes and nose bleeding ).    HPI     Presents for follow-up.  Has been doing well.  Next [employer]  classes starts tomorrow.  Has been waking up earlier and on a more routine schedule.  Did have 1 episode of right-sided nosebleed that was unprovoked.  Reports having them intermittently in the past.  Has been started on venlafaxine which has found to be beneficial so far    Review of Systems    8 point review of systems is otherwise negative unless mentioned on HPI      Objective   /65   Pulse 74   Ht 1.93 m (6' 4\")   Wt 103 kg (226 lb)   SpO2 96%   BMI 27.51 kg/m²     Physical Exam    General: No acute distress  HEENT: EOMI  CV: Regular rate and rhythm, normal S1 and S2, no murmurs  Pulm: Clear to auscultation bilaterally, no wheezings, rales or rhonchi  Abd: Nondistended  MSK: 5/5 strength in all extremities  Skin: No rashes   Lymphatic: No lymphadenopathy      Assessment/Plan       #Allergic rhinitis  #seasonal allergies  #epistaxis  -claritin 10mg PRN, flonase nasal spray bilaterally  -use nasal saline as needed for Vaseline      #Cervical lymph nodes  -Underwent excisional biopsy with ENT with normal results  -family hx of Hodgkin's Lymphoma (father)     #Anxiety   #Bipolar II  -on aripiprazole and recently started on venlafaxine   -seeing behavior health every month     RTC 6 months    This note was dictated by speech recognition. Minor errors in transcription may be present.         "

## 2024-09-05 DIAGNOSIS — F31.81 BIPOLAR 2 DISORDER (MULTI): ICD-10-CM

## 2024-09-05 DIAGNOSIS — F41.9 ANXIETY: ICD-10-CM

## 2024-09-11 ENCOUNTER — APPOINTMENT (OUTPATIENT)
Dept: BEHAVIORAL HEALTH | Facility: CLINIC | Age: 24
End: 2024-09-11
Payer: MEDICAID

## 2024-09-11 DIAGNOSIS — F41.1 GAD (GENERALIZED ANXIETY DISORDER): ICD-10-CM

## 2024-09-11 DIAGNOSIS — F19.10 SUBSTANCE ABUSE (MULTI): ICD-10-CM

## 2024-09-11 DIAGNOSIS — F32.1 CURRENT MODERATE EPISODE OF MAJOR DEPRESSIVE DISORDER, UNSPECIFIED WHETHER RECURRENT (MULTI): Chronic | ICD-10-CM

## 2024-09-11 DIAGNOSIS — F31.81 BIPOLAR 2 DISORDER (MULTI): Primary | ICD-10-CM

## 2024-09-11 DIAGNOSIS — F90.2 ATTENTION DEFICIT HYPERACTIVITY DISORDER (ADHD), COMBINED TYPE: ICD-10-CM

## 2024-09-11 PROCEDURE — 99214 OFFICE O/P EST MOD 30 MIN: CPT | Performed by: PSYCHIATRY & NEUROLOGY

## 2024-09-11 RX ORDER — VENLAFAXINE 75 MG/1
75 TABLET ORAL 2 TIMES DAILY
Qty: 60 TABLET | Refills: 11 | Status: SHIPPED | OUTPATIENT
Start: 2024-09-11 | End: 2025-09-11

## 2024-09-11 RX ORDER — VENLAFAXINE HYDROCHLORIDE 150 MG/1
150 CAPSULE, EXTENDED RELEASE ORAL DAILY
Qty: 90 CAPSULE | Refills: 1 | OUTPATIENT
Start: 2024-09-11

## 2024-09-11 RX ORDER — ESCITALOPRAM OXALATE 10 MG/1
10 TABLET ORAL DAILY
Qty: 90 TABLET | Refills: 1 | OUTPATIENT
Start: 2024-09-11

## 2024-09-11 NOTE — PROGRESS NOTES
"Outpatient Psychiatry - Followup Medication Management     Location of service:  __X_ Office       ___ A/V            ___Telephone (3 factor ID completed)        Subjective   Luciano Tejeda, a 24 y.o. male, for followup visit.        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             Codes     Bipolar 2 disorder (CMS/[facility])     F31.81     Relevant Medications        Abilify (Aripiprazole) 5 mg      Venlafaxine 75 mg (reducing from 150 with option to resume 150 if ineffective)        Treatment Goals:    Anxiety: acquiring relapse prevention skills, reducing negative automatic thoughts, and reducing physical symptoms of anxiety  Drug & Alcohol: reducing/eliminating substance use  Bipolar II:  Reducing;/eliminating mood swings, reducing/eliminating spending sprees, gambling, irritability        Treatment Plan/Recommendations:   Reduce Venlafaxine to 75 mg - may go back to 150 if feels less effective  Continue Aripiprazole  Continue with therapist  Followup (virtual)  4 weeks; November appt will be in-person        Follow-up plan was discussed with patient.        Review with patient: Treatment plan reviewed with the patient.  Medication risks/benefit reviewed with the patient        HPI:    Patient returns for followup; Mom also comments at beginning.   Mom says he is drinking and irritable; did not follow through with scholarship application but plans to start school; also says Arsen (therapist) has stopped seeing him, implying he is not progressing.  Feliciano says he was drinking for about 10 days; 5 days twice a day, 5 days once a day and has now quit again; he had a bad argument with girlfriend and felt he needed it to \"cope;\" they resolved issue and he realized he can't be drinking on the meds.  Has been dry for about " "4 days so far, intends to continue.  Also says Arsen did not terminate at all; they discussed with his school schedule it was difficult to set up appointments and he should call when ready/able to return. Feliciano has found him very helpful and says he intends to continue.  Says he has gotten emails and texts from school saying his application is incomplete and he doesn't know what else they need; agrees he needs to speak directly with scholarship/admin office before he loses opportunity.  Asks if we can reduce Venlafaxine; feels it is \"too strong;\" is helping with motivation (he thinks; Mom says not) but making him too sleepy and irritable; goes to sleep now at 10, so not drinking and drugging at night but \"she's never satisfied.\"  Does think the Abilify helps, is taking regularly, helps him \"keep it together.\"     Still at [email or web address], still unhappy there, has cut back a day, plans to stop when school starts:  Reminded him if he doesn't get the scholarship money I'm sure he'll need to be working to pay tuition.  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         Current Medications:     Current Outpatient Medications:     aripiprazole 5 mg  Bupropion 150 mg (ineffective)  (+certizine, fluticonasone, loratadine, pantoprazole, polymixin from others)        Interim Medical History and Review of Systems:  No changes     Record Review: moderate     Psychiatric Review Of Systems:   Depressive Symptoms: moderate  Manic Symptoms: Irritability (may be manic or may be depressive) sleep is okay; smoking and vaping daily; not taking action towards future goals (e.g. going to veteranary school)  Anxiety Symptoms: Minimal  Disordered Eating Symptoms: NA  Inattentive Symptoms: improved with increased " "meds  Hyperactive/Impulsive Symptoms: See abpve  Trauma Symptoms: NA  Conduct Issues: NA  Psychotic Symptoms: NA  Developmental Concerns: NA  Other Symptoms/Concerns:NA  Delirium/Altered Mental Status Symptoms: NA        Objective   Mental Status Exam:  Patient is a well nourished, well developed young adult white male appearing to be approximately stated age  Appearance:   Well groomed, appropriately dressed   Attention:  Oriented X 3  Speech:  fluent, coherent, no evidence for formal thought disorder      Process:  abstract; occasional concrete associations       Content:  without hallucinations, delusions,  denies SI, HI  Movements: No apparent gait disturbance or abnormal involuntary movements of face or trunk  Mood:  \"a little more depressed\"  Affect:  Blunted but downcast  Cognition:   Grossly intact and appropriate to level of educational attainment  Judgement:   Fair  Insight:   Good         Vitals:  There were no vitals filed for this visit.     Summary of Psychotherapy Component:       Psychoeducation/Therapeutic Interventions during this visit:   __X_ Education re: symptoms, diagnosis and treatment options   __x_ Discussed physiologic factors that impact symptoms and stress   __X_ Discussed patterns of behavior and coping style that contribute to symptoms and presented alternative options   __X_ Identified cognitive distortions and presented alternative options   __X_ Reinforced boundaries of control and responsibility in symptom management   __X_ Education re: impact of alcohol/drugs on symptoms and discussed treatment resources/rationale   ___ Explored, identified, reinforced strategies and resources for coping with symptoms    __X_ Discussed medication options, risks, benefits with patient and/or family/guardian      Psychotherapeutic approach:  __X_Individual                          __X_Supportive          :     Other relevant content:  (see HPI for relevant details)     Time spent in therapy 20\"   " "  Total time spent 30\"  "

## 2024-10-09 ENCOUNTER — APPOINTMENT (OUTPATIENT)
Dept: BEHAVIORAL HEALTH | Facility: CLINIC | Age: 24
End: 2024-10-09
Payer: MEDICAID

## 2024-10-09 DIAGNOSIS — F41.1 GAD (GENERALIZED ANXIETY DISORDER): ICD-10-CM

## 2024-10-09 DIAGNOSIS — F31.81 BIPOLAR 2 DISORDER (MULTI): ICD-10-CM

## 2024-10-09 DIAGNOSIS — F19.10 SUBSTANCE ABUSE (MULTI): ICD-10-CM

## 2024-10-09 PROCEDURE — 99214 OFFICE O/P EST MOD 30 MIN: CPT | Performed by: PSYCHIATRY & NEUROLOGY

## 2024-10-09 RX ORDER — ARIPIPRAZOLE 5 MG/1
5 TABLET ORAL DAILY
Qty: 30 TABLET | Refills: 2 | Status: SHIPPED | OUTPATIENT
Start: 2024-10-09 | End: 2025-01-07

## 2024-10-09 NOTE — PROGRESS NOTES
Outpatient Psychiatry - Followup Medication Management     Location of service:  ___ Office       _X__ A/V            ___Telephone (3 factorID        completed)        Subjective   Luciano Tejeda, a 24 y.o. male, for followup visit.        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             Codes     Bipolar 2 disorder (CMS/[facility])     F31.81     Relevant Medications        Abilify (Aripiprazole) 5 mg      Venlafaxine 75 mg (with option to resume 150 if ineffective)        Treatment Goals:    Anxiety: acquiring relapse prevention skills, reducing negative automatic thoughts, and reducing physical symptoms of anxiety  Drug & Alcohol: reducing/eliminating substance use  Bipolar II:  Reducing;/eliminating mood swings, reducing/eliminating spending sprees, gambling, irritability        Treatment Plan/Recommendations:   Try increase to 150 mg Venlafaxine; if too much may reduce to 75 again  Continue Aripiprazole  Continue with therapist  Followup (in person) in 4 weeks        Follow-up plan was discussed with patient.        Review with patient: Treatment plan reviewed with the patient.  Medication risks/benefit reviewed with the patient        HPI:    Patient returns for followup; Mom not on call today but he reports they are fighting again.   Mom thinks the FBI is surveiling their house and won't let him go outside even to smoke a cigarette; story is that someone who had worked at [employer] got in some kind of trouble and stayed with them for a night or two, and a few weeks later the FBI came to house to ask questions about him; since then she has been paranoid according to Feliciano.  Continues at [employer] although cut back to 3 days a week; still looking for other jobs.  Mom created a resume for him - he screen shares it so  "I can see - not sure what he will apply for but still interested in veteran[employer] job if available.   Registration for school starts in 2 weeks and he is reviewing proposed schedule for spring semester.  Reports meds \"working really well\" for him, feels like he has \"a whole new outlook;\"  around 9 pm gets tired and decides whether or not to go to bed rather than shira all night.  Mood has been good other than when he and Mom are fighting.  May have a drink or two on occasion but just makes him sleepy on the meds so not much motivation to drink.  Still vapes on occasion but less frequent; has been \"out\" for a week and not bothered to fill.  Still smoking cigarettes (less??)  Otherwise no complaints although gets worked up a bit as discusses conflicts with Mom; agrees that ultimately he needs to live on his own, but currently dependent financially.       Current Medications:     Current Outpatient Medications:     aripiprazole 5 mg  Venlafaxine 75 mg:  helpful but he thinks 150 might work better for him.  Reminded him he has the meds and can try twice a day again if he wishes.    (+certizine, fluticonasone, loratadine, pantoprazole, polymixin from others)        Interim Medical History and Review of Systems:  No changes     Record Review: moderate     Psychiatric Review Of Systems:   Depressive Symptoms: moderate  Manic Symptoms: Irritability (may be manic or may be depressive) sleep is okay; smoking and vaping daily; not taking action towards future goals (e.g. going to veteranary school)  Anxiety Symptoms: Minimal  Disordered Eating Symptoms: NA  Inattentive Symptoms: improved with increased meds  Hyperactive/Impulsive Symptoms: See abpve  Trauma Symptoms: NA  Conduct Issues: NA  Psychotic Symptoms: NA  Developmental Concerns: NA  Other Symptoms/Concerns:NA  Delirium/Altered Mental Status Symptoms: NA        Objective   Mental Status Exam:  Patient is a well nourished, well developed young adult white male appearing " "to be approximately stated age  Appearance:   Well groomed, appropriately dressed   Attention:  Oriented X 3  Speech:  fluent, coherent, no evidence for formal thought disorder      Process:  abstract; occasional concrete associations       Content:  without hallucinations, delusions,  denies SI, HI  Movements: No apparent gait disturbance or abnormal involuntary movements of face or trunk  Mood:  \"pretty good\"  Affect:  pleasant and conversant, but gets irritable when discussing conflicts with Mom  Cognition:   Grossly intact and appropriate to level of educational attainment  Judgement:   Fair  Insight:   Good         Vitals:  There were no vitals filed for this visit.     Summary of Psychotherapy Component:       Psychoeducation/Therapeutic Interventions during this visit:   __X_ Education re: symptoms, diagnosis and treatment options   __x_ Discussed physiologic factors that impact symptoms and stress   __X_ Discussed patterns of behavior and coping style that contribute to symptoms and presented alternative options   __X_ Identified cognitive distortions and presented alternative options   __X_ Reinforced boundaries of control and responsibility in symptom management   __X_ Education re: impact of alcohol/drugs on symptoms and discussed treatment resources/rationale   ___ Explored, identified, reinforced strategies and resources for coping with symptoms    __X_ Discussed medication options, risks, benefits with patient and/or family/guardian      Psychotherapeutic approach:  __X_Individual                          __X_Supportive          :     Other relevant content:  (see HPI for relevant details)     Time spent in therapy 20\"     Total time spent 30\"  "

## 2024-11-08 ENCOUNTER — APPOINTMENT (OUTPATIENT)
Dept: PRIMARY CARE | Facility: CLINIC | Age: 24
End: 2024-11-08
Payer: MEDICAID

## 2024-11-08 VITALS — WEIGHT: 231 LBS | DIASTOLIC BLOOD PRESSURE: 60 MMHG | BODY MASS INDEX: 28.12 KG/M2 | SYSTOLIC BLOOD PRESSURE: 100 MMHG

## 2024-11-08 DIAGNOSIS — Z00.00 HEALTHCARE MAINTENANCE: Primary | ICD-10-CM

## 2024-11-08 DIAGNOSIS — K21.9 GASTROESOPHAGEAL REFLUX DISEASE, UNSPECIFIED WHETHER ESOPHAGITIS PRESENT: ICD-10-CM

## 2024-11-08 PROCEDURE — 99395 PREV VISIT EST AGE 18-39: CPT | Performed by: STUDENT IN AN ORGANIZED HEALTH CARE EDUCATION/TRAINING PROGRAM

## 2024-11-08 RX ORDER — PANTOPRAZOLE SODIUM 40 MG/1
40 TABLET, DELAYED RELEASE ORAL
Qty: 90 TABLET | Refills: 1 | Status: SHIPPED | OUTPATIENT
Start: 2024-11-08 | End: 2025-05-07

## 2024-11-08 ASSESSMENT — PATIENT HEALTH QUESTIONNAIRE - PHQ9
SUM OF ALL RESPONSES TO PHQ9 QUESTIONS 1 AND 2: 0
1. LITTLE INTEREST OR PLEASURE IN DOING THINGS: NOT AT ALL
2. FEELING DOWN, DEPRESSED OR HOPELESS: NOT AT ALL

## 2024-11-08 NOTE — PROGRESS NOTES
Subjective   Patient ID: Feliciano Tejeda is a 24 y.o. male who presents for Annual Exam (physical).    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  Goes to the [facility] center, basketball or swimming  Sometimes eats late at night  Not as much video games    Review of Systems    8 point review of systems is otherwise negative unless mentioned on HPI      Objective   /60   Wt 105 kg (231 lb)   BMI 28.12 kg/m²     Physical Exam    General: No acute distress  HEENT: EOMI  CV: Regular rate and rhythm, normal S1 and S2, no murmurs  Pulm: Clear to auscultation bilaterally, no wheezings, rales or rhonchi  Abd: Nondistended  MSK: 5/5 strength in all extremities  Skin: No rashes   Lymphatic: No lymphadenopathy      Assessment/Plan       #Allergic rhinitis  #seasonal allergies  #epistaxis  -claritin 10mg PRN, flonase nasal spray bilaterally  -use nasal saline as needed for Vaseline      #Cervical lymph nodes  -Underwent excisional biopsy with ENT with normal results  -family hx of Hodgkin's Lymphoma (father)     #Anxiety   #Bipolar II  -on aripiprazole and venlafaxine   -seeing behavior health every month     RTC 6 months     This note was dictated by speech recognition. Minor errors in transcription may be present.

## 2024-11-13 ENCOUNTER — APPOINTMENT (OUTPATIENT)
Dept: BEHAVIORAL HEALTH | Facility: CLINIC | Age: 24
End: 2024-11-13
Payer: MEDICAID

## 2024-11-13 DIAGNOSIS — F19.10 SUBSTANCE ABUSE (MULTI): ICD-10-CM

## 2024-11-13 DIAGNOSIS — F90.2 ATTENTION DEFICIT HYPERACTIVITY DISORDER (ADHD), COMBINED TYPE: ICD-10-CM

## 2024-11-13 DIAGNOSIS — F43.23 ADJUSTMENT DISORDER WITH MIXED ANXIETY AND DEPRESSED MOOD: ICD-10-CM

## 2024-11-13 DIAGNOSIS — F31.81 BIPOLAR 2 DISORDER (MULTI): ICD-10-CM

## 2024-11-13 PROCEDURE — 99214 OFFICE O/P EST MOD 30 MIN: CPT | Performed by: PSYCHIATRY & NEUROLOGY

## 2024-11-13 NOTE — PROGRESS NOTES
"Outpatient Psychiatry - Followup Medication Management     Location of service:  ___ Office       _X__ A/V            ___Telephone (3 factorID                                                                          completed)        Subjective   Luciano Tejeda, a 24 y.o. male, for followup visit.        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             Codes     Bipolar 2 disorder (CMS/[facility])     F31.81     Relevant Medications        Abilify (Aripiprazole) 5 mg      Venlafaxine 75 mg (with option to resume 150 if ineffective)        Treatment Goals:    Anxiety: acquiring relapse prevention skills, reducing negative automatic thoughts, and reducing physical symptoms of anxiety  Drug & Alcohol: reducing/eliminating substance use  Bipolar II:  Reducing;/eliminating mood swings, reducing/eliminating spending sprees, gambling, irritability        Treatment Plan/Recommendations:   [id number] mg Venlafaxine (2X75mg)  Continue Aripiprazole  Reconnect with Arsen (therapist)  Followup (virtual) in 4 weeks        Follow-up plan was discussed with patient.        Review with patient: Treatment plan reviewed with the patient.  Medication risks/benefit reviewed with the patient        HPI:    Patient returns for followup; Tells me he's doing well, thinks Meds are helping to keep him \"calm\" although Mom continues to struggle with him at times.   Sleeping well at night now, although last night stayed up late shira - says he took a 3 hour \"nap\" and then couldn't get back to sleep.  Denies hallucinations, other psychotic symptoms; no manic symptoms and not feeling depressed.  Says Mom is complaining that she hears noises from the wireless internet signal, \"she's getting crazier.\"   Mom insists on meeting with me afterwards \"off " "the record,\" says she wants to show me what she's dealing with and has a video of a car sitting across the street; she thinks it's the FBI and that they are watching the house because of \"the kid\" who Feliciano was friendly with at work who \"dissappeared.\"    She has not gone out to ask who the people in the car are; says she called the police but they told her it's a public road; the car isn't always there but was there yesterday so she filmed it.  I tell her I don't know what she's trying to tell me, that it doesn't make sense, she responds \"That's the point!\"   Also says Feliciano NOT taking care of himself, leaves clothes all over the place, she has to remind him to take the pills, if she doesn't wake him up he's late for work, says he's enrolled in school but he is not, \"the pills aren't working.\"    I tell her I don't have pills to make him  his room or get up in time for work, she says \"if he keeps this up he's going to have to go somewhere else.\"        Current Medications:     Current Outpatient Medications:     aripiprazole 5 mg  + Venlafaxine 150 mg     (+certizine, fluticonasone, loratadine, pantoprazole, polymixin from others)        Interim Medical History and Review of Systems:  No changes     Record Review: moderate     Psychiatric Review Of Systems:   Depressive Symptoms: moderate  Manic Symptoms: Less irritable; sleep is usually okay; still smoking and vaping daily; not taking action towards future goals (e.g. going to veteranary school)  Anxiety Symptoms: Minimal  Disordered Eating Symptoms: NA  Inattentive Symptoms: improved with increased meds  Hyperactive/Impulsive Symptoms: See abpve  Trauma Symptoms: NA  Conduct Issues: NA  Psychotic Symptoms: NA  Developmental Concerns: NA  Other Symptoms/Concerns:NA  Delirium/Altered Mental Status Symptoms: NA        Objective   Mental Status Exam:  Patient is a well nourished, well developed young adult white male appearing to be approximately stated " "age  Appearance:   Well groomed, appropriately dressed   Attention:  Oriented X 3  Speech:  fluent, coherent, no evidence for formal thought disorder      Process:  abstract; occasional concrete associations       Content:  without hallucinations, delusions,  denies SI, HI  Movements: No apparent gait disturbance or abnormal involuntary movements of face or trunk  Mood:  \"pretty good\"  Affect:  pleasant and conversant, but gets irritable when discussing conflicts with Mom  Cognition:   Grossly intact and appropriate to level of educational attainment  Judgement:   Fair  Insight:   Good         Vitals:  There were no vitals filed for this visit.     Summary of Psychotherapy Component:       Psychoeducation/Therapeutic Interventions during this visit:   __X_ Education re: symptoms, diagnosis and treatment options   __x_ Discussed physiologic factors that impact symptoms and stress   __X_ Discussed patterns of behavior and coping style that contribute to symptoms and presented alternative options   __X_ Identified cognitive distortions and presented alternative options   __X_ Reinforced boundaries of control and responsibility in symptom management   __X_ Education re: impact of alcohol/drugs on symptoms and discussed treatment resources/rationale   ___ Explored, identified, reinforced strategies and resources for coping with symptoms    __X_ Discussed medication options, risks, benefits with patient and/or family/guardian      Psychotherapeutic approach:  __X_Individual                          __X_Supportive          :     Other relevant content:  (see HPI for relevant details)     Time spent in therapy 20\"     Total time spent 30\"  "

## 2024-12-11 ENCOUNTER — APPOINTMENT (OUTPATIENT)
Dept: BEHAVIORAL HEALTH | Facility: CLINIC | Age: 24
End: 2024-12-11
Payer: MEDICAID

## 2024-12-11 DIAGNOSIS — F90.2 ATTENTION DEFICIT HYPERACTIVITY DISORDER (ADHD), COMBINED TYPE: ICD-10-CM

## 2024-12-11 DIAGNOSIS — F19.10 SUBSTANCE ABUSE (MULTI): ICD-10-CM

## 2024-12-11 DIAGNOSIS — F32.1 CURRENT MODERATE EPISODE OF MAJOR DEPRESSIVE DISORDER, UNSPECIFIED WHETHER RECURRENT (MULTI): ICD-10-CM

## 2024-12-11 DIAGNOSIS — F41.1 GAD (GENERALIZED ANXIETY DISORDER): ICD-10-CM

## 2024-12-11 DIAGNOSIS — F31.81 BIPOLAR 2 DISORDER (MULTI): Primary | ICD-10-CM

## 2024-12-11 PROCEDURE — 99215 OFFICE O/P EST HI 40 MIN: CPT | Performed by: PSYCHIATRY & NEUROLOGY

## 2024-12-11 RX ORDER — ARIPIPRAZOLE 5 MG/1
5 TABLET ORAL DAILY
Qty: 30 TABLET | Refills: 2 | Status: SHIPPED | OUTPATIENT
Start: 2024-12-11 | End: 2025-03-11

## 2024-12-11 RX ORDER — FLUOXETINE HYDROCHLORIDE 20 MG/1
20 CAPSULE ORAL DAILY
Qty: 30 CAPSULE | Refills: 2 | Status: SHIPPED | OUTPATIENT
Start: 2024-12-11 | End: 2025-03-11

## 2024-12-11 NOTE — PROGRESS NOTES
"Outpatient Psychiatry - Followup Medication Management     Location of service:  ___ Office       _X__ A/V            ___Telephone (3 factorID                                                                          completed)        Subjective   Luciano Tejeda, a 24 y.o. male, for followup visit.        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             Codes     Bipolar 2 disorder (CMS/[facility])     F31.81               Treatment Goals:    Anxiety: acquiring relapse prevention skills, reducing negative automatic thoughts, and reducing physical symptoms of anxiety  Drug & Alcohol: reducing/eliminating substance use  Bipolar II:  Reducing;/eliminating mood swings, reducing/eliminating spending sprees, gambling, irritability        Treatment Plan/Recommendations:        Abilify (Aripiprazole) 5 mg (1 daily and 1 additional as needed)   Venlafaxine 150 mg (discontinuing today due to nausua/vomiting?)  Fluoxetine 20 mg daily (ew today)  Followup with therapy (Arsen)  RTC 1 month        Follow-up plan was discussed with patient.        Review with patient: Treatment plan reviewed with the patient.  Medication risks/benefit reviewed with the patient        HPI:    Patient returns for followup; Mother joins for first 15\" to tell me he is not doing well, he is spending money but not earning enough; says shifts at [employer] are cut back (he argues that was only one shift) and he is only making about $400 every 2 weeks (he says that is not true).  She calls him a \"BS'er\" and says he BS's about registering for school - he has not - and about looking for another job; spends time shira in his room, is irritable and nasty with her when she confronts him.   She thinks meds are not working and that he needs to learn better coping " "skills...  Alone he says he and  Mom are struggling as noticed above.  Admits he is bored and using [employer] \"vape\" to get through work days; came home early \"only one day\" but then says he is getting shorter shifts (4 hours vs. 6-8 hours) and not getting the bonus pay for \"closing\" that he used to get.  Again talks about job with [employer] but still has not applied.  Says he is registered for school but admits has not scheduled classes or pursued [employer]/scholarship funds.   Feels no motivation, gets tired by late afternoon, not accomplishing a lot.  Again discussed that he is 25 years old and may do better living on his own, but he feels he cannot afford it so he's \"stuck\"  Says he hasn't followed through with Arsen because last thing Arsen said is to call him once he has his class schedule in place, and he's embarassed to tell him he hasn't done it yet; but then agrees Arsen is \"non-judgmental\" and agrees to contact him.  Tells me he gets \"sick\" and throws up when he takes the 75 mg Venlafaxine pills so switched over to 150 mg capsules they had left over from before; tolerating better but still gets nauseus and agrees he would like to try a new medication.  Denies any symptoms  suggestive of con (except perhaps spending? But feels that is from \"boredom\")  Agrees to taper off Venlafaxine (if can tolerate the pills - go to 75 for a week, then 37.5 for a week then DC; if can't tolerate them then try to go off directly, chances are hasn't been taking enough to require a full taper?)   and start Fluoxetine 20 mg daily.    Current Medications:     Current Outpatient Medications:     aripiprazole 5 mg  + Venlafaxine 150 mg     (+certizine, fluticonasone, loratadine, pantoprazole, polymixin from others)        Interim Medical History and Review of Systems:  No changes     Record Review: moderate     Psychiatric Review Of Systems:   Depressive Symptoms: moderate  Manic Symptoms: Less irritable; sleep is usually okay; " "still smoking and vaping daily; not taking action towards future goals (e.g. going to veteranary school)  Anxiety Symptoms: Minimal  Disordered Eating Symptoms: NA  Inattentive Symptoms: improved with increased meds  Hyperactive/Impulsive Symptoms: See abpve  Trauma Symptoms: NA  Conduct Issues: NA  Psychotic Symptoms: NA  Developmental Concerns: NA  Other Symptoms/Concerns:NA  Delirium/Altered Mental Status Symptoms: NA        Objective   Mental Status Exam:  Patient is a well nourished, well developed young adult white male appearing to be approximately stated age  Appearance:   Well groomed, appropriately dressed   Attention:  Oriented X 3  Speech:  fluent, coherent, no evidence for formal thought disorder      Process:  abstract; occasional concrete associations       Content:  without hallucinations, delusions,  denies SI, HI  Movements: No apparent gait disturbance or abnormal involuntary movements of face or trunk  Mood:  \"pretty good\"  Affect:  pleasant and conversant, but gets irritable when discussing conflicts with Mom  Cognition:   Grossly intact and appropriate to level of educational attainment  Judgement:   Fair  Insight:   Good         Vitals:  There were no vitals filed for this visit.     Summary of Psychotherapy Component:       Psychoeducation/Therapeutic Interventions during this visit:   __X_ Education re: symptoms, diagnosis and treatment options   __x_ Discussed physiologic factors that impact symptoms and stress   __X_ Discussed patterns of behavior and coping style that contribute to symptoms and presented alternative options   __X_ Identified cognitive distortions and presented alternative options   __X_ Reinforced boundaries of control and responsibility in symptom management   __X_ Education re: impact of alcohol/drugs on symptoms and discussed treatment resources/rationale   ___ Explored, identified, reinforced strategies and resources for coping with symptoms    __X_ Discussed " "medication options, risks, benefits with patient and/or family/guardian      Psychotherapeutic approach:  __X_Individual                          __X_Supportive          :     Other relevant content:  (see HPI for relevant details)     Time spent in therapy 30\"     Total time spent 45\"  "

## 2025-01-08 ENCOUNTER — APPOINTMENT (OUTPATIENT)
Dept: BEHAVIORAL HEALTH | Facility: CLINIC | Age: 25
End: 2025-01-08
Payer: MEDICAID

## 2025-01-08 DIAGNOSIS — F31.81 BIPOLAR 2 DISORDER (MULTI): ICD-10-CM

## 2025-01-08 DIAGNOSIS — F19.10 SUBSTANCE ABUSE (MULTI): ICD-10-CM

## 2025-01-08 DIAGNOSIS — F90.2 ATTENTION DEFICIT HYPERACTIVITY DISORDER (ADHD), COMBINED TYPE: ICD-10-CM

## 2025-01-08 PROCEDURE — 99214 OFFICE O/P EST MOD 30 MIN: CPT | Performed by: PSYCHIATRY & NEUROLOGY

## 2025-01-08 NOTE — PROGRESS NOTES
"Outpatient Psychiatry - Followup Medication Management     Location of service:  ___ Office       _X__ A/V            ___Telephone (3 factorID                                                                          completed)        Subjective   Luciano Tejeda, a 24 y.o. male, for followup visit.        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             Codes     Bipolar 2 disorder (CMS/[facility])     F31.81                  Treatment Goals:    Anxiety: acquiring relapse prevention skills, reducing negative automatic thoughts, and reducing physical symptoms of anxiety  Drug & Alcohol: reducing/eliminating substance use  Bipolar II:  Reducing;/eliminating mood swings, reducing/eliminating spending sprees, gambling, irritability        Treatment Plan/Recommendations:      Continue     Abilify (Aripiprazole) 5 mg (1 daily and 1 additional as needed)   Fluoxetine 20 mg daily   Followup with therapy (Arsen)  RTC 1 month        Follow-up plan was discussed with patient.        Review with patient: Treatment plan reviewed with the patient.  Medication risks/benefit reviewed with the patient        HPI:    Patient returns for followup; Mother joins for first 5\" to tell me he is doing well, calmer and more polite with her, seems to be sleeping normal hours and going to work, and is registered for classes.    Alone he agrees new med (Prozac) working well; feels calmer and \"more together;\" feels \"more in touch with my body,\" e.g. feels when he's tired and goes to sleep, feels more focused and organized.  Tells me he has \"done everything but press the button\" on his registration (?) Classes start Jan. 13, which I reminded him is just in a few days so he needs to complete registration or it will be too late.  Wants to be sure this " "time all classes are within the area of study he will get his scholarship in, as last time he tried he only would have gotten half.  Agrees with Mom that he'll show us both his grades at end of semester.  Says with Mom he is trying to stay calm and not react to her yelling - even brother has noticed and commented.   No side effects or problems reported     Current Medications:     Current Outpatient Medications:     aripiprazole 5 mg  + Fluoxetine 20 mg     (+certizine, fluticonasone, loratadine, pantoprazole, polymixin from others)        Interim Medical History and Review of Systems:  No changes     Record Review: moderate     Psychiatric Review Of Systems:   Depressive Symptoms: moderate  Manic Symptoms: Less irritable; sleep is usually okay; still smoking and vaping daily; not taking action towards future goals (e.g. going to veteranary school)  Anxiety Symptoms: Minimal  Disordered Eating Symptoms: NA  Inattentive Symptoms: improved with increased meds  Hyperactive/Impulsive Symptoms: See abpve  Trauma Symptoms: NA  Conduct Issues: NA  Psychotic Symptoms: NA  Developmental Concerns: NA  Other Symptoms/Concerns:NA  Delirium/Altered Mental Status Symptoms: NA        Objective   Mental Status Exam:  Patient is a well nourished, well developed young adult white male appearing to be approximately stated age  Appearance:   Well groomed, appropriately dressed   Attention:  Oriented X 3  Speech:  fluent, coherent, no evidence for formal thought disorder      Process:  abstract; occasional concrete associations       Content:  without hallucinations, delusions,  denies SI, HI  Movements: No apparent gait disturbance or abnormal involuntary movements of face or trunk  Mood:  \"pretty good\"  Affect:  pleasant and conversant, but gets irritable when discussing conflicts with Mom  Cognition:   Grossly intact and appropriate to level of educational attainment  Judgement:   Fair  Insight:   Good         Vitals:  There were no " vitals filed for this visit.     Summary of Psychotherapy Component:       Psychoeducation/Therapeutic Interventions during this visit:   __X_ Education re: symptoms, diagnosis and treatment options   __x_ Discussed physiologic factors that impact symptoms and stress   __X_ Discussed patterns of behavior and coping style that contribute to symptoms and presented alternative options   __X_ Identified cognitive distortions and presented alternative options   __X_ Reinforced boundaries of control and responsibility in symptom management   __X_ Education re: impact of alcohol/drugs on symptoms and discussed treatment resources/rationale   ___ Explored, identified, reinforced strategies and resources for coping with symptoms    __X_ Discussed medication options, risks, benefits with patient and/or family/guardian      Psychotherapeutic approach:  __X_Individual                          __X_Supportive          :     Other relevant content:  (see HPI for relevant details)

## 2025-01-15 ENCOUNTER — APPOINTMENT (OUTPATIENT)
Dept: BEHAVIORAL HEALTH | Facility: CLINIC | Age: 25
End: 2025-01-15
Payer: MEDICAID

## 2025-01-16 ENCOUNTER — APPOINTMENT (OUTPATIENT)
Dept: OPHTHALMOLOGY | Facility: CLINIC | Age: 25
End: 2025-01-16
Payer: MEDICAID

## 2025-01-16 DIAGNOSIS — H52.03 HYPERMETROPIA OF BOTH EYES: ICD-10-CM

## 2025-01-16 DIAGNOSIS — H52.223 REGULAR ASTIGMATISM OF BOTH EYES: Primary | ICD-10-CM

## 2025-01-16 PROCEDURE — 92014 COMPRE OPH EXAM EST PT 1/>: CPT | Performed by: OPTOMETRIST

## 2025-01-16 PROCEDURE — 92015 DETERMINE REFRACTIVE STATE: CPT | Performed by: OPTOMETRIST

## 2025-01-16 ASSESSMENT — REFRACTION_MANIFEST
OD_SPHERE: +0.25
OD_AXIS: 095
OS_SPHERE: +0.50
OD_CYLINDER: -1.00
OD_SPHERE: +0.25
OS_AXIS: 061
OD_AXIS: 095
OS_CYLINDER: -1.00
OS_AXIS: 060
OS_SPHERE: +0.25
OD_CYLINDER: -1.00
OS_CYLINDER: -1.00
METHOD_AUTOREFRACTION: 1

## 2025-01-16 ASSESSMENT — TONOMETRY
OS_IOP_MMHG: 12
IOP_METHOD: GOLDMANN APPLANATION
OD_IOP_MMHG: 12

## 2025-01-16 ASSESSMENT — CONF VISUAL FIELD
METHOD: COUNTING FINGERS
OS_INFERIOR_TEMPORAL_RESTRICTION: 0
OD_NORMAL: 1
OS_SUPERIOR_NASAL_RESTRICTION: 0
OD_INFERIOR_TEMPORAL_RESTRICTION: 0
OS_SUPERIOR_TEMPORAL_RESTRICTION: 0
OS_INFERIOR_NASAL_RESTRICTION: 0
OS_NORMAL: 1
OD_SUPERIOR_TEMPORAL_RESTRICTION: 0
OD_SUPERIOR_NASAL_RESTRICTION: 0
OD_INFERIOR_NASAL_RESTRICTION: 0

## 2025-01-16 ASSESSMENT — REFRACTION_WEARINGRX
OD_CYLINDER: -1.00
OS_CYLINDER: -0.75
OD_AXIS: 093
SPECS_TYPE: DVO
OD_SPHERE: PLANO
OS_SPHERE: +0.25
OS_AXIS: 061

## 2025-01-16 ASSESSMENT — VISUAL ACUITY
OS_CC: 20/20
METHOD: SNELLEN - LINEAR
OD_CC: 20/20
CORRECTION_TYPE: GLASSES

## 2025-01-16 ASSESSMENT — ENCOUNTER SYMPTOMS
RESPIRATORY NEGATIVE: 0
EYES NEGATIVE: 1
ALLERGIC/IMMUNOLOGIC NEGATIVE: 0
CONSTITUTIONAL NEGATIVE: 0
NEUROLOGICAL NEGATIVE: 0
CARDIOVASCULAR NEGATIVE: 0
MUSCULOSKELETAL NEGATIVE: 0
PSYCHIATRIC NEGATIVE: 0
ENDOCRINE NEGATIVE: 0
HEMATOLOGIC/LYMPHATIC NEGATIVE: 0
GASTROINTESTINAL NEGATIVE: 0

## 2025-01-16 ASSESSMENT — SLIT LAMP EXAM - LIDS
COMMENTS: NORMAL
COMMENTS: NORMAL

## 2025-01-16 ASSESSMENT — EXTERNAL EXAM - RIGHT EYE: OD_EXAM: NORMAL

## 2025-01-16 ASSESSMENT — EXTERNAL EXAM - LEFT EYE: OS_EXAM: NORMAL

## 2025-01-16 ASSESSMENT — REFRACTION
OS_AXIS: 060
OD_SPHERE: PLANO
OD_AXIS: 095
OD_CYLINDER: -1.00
OS_SPHERE: +0.25
OS_CYLINDER: -1.00

## 2025-01-16 ASSESSMENT — CUP TO DISC RATIO
OD_RATIO: 0.2
OS_RATIO: 0.2

## 2025-01-16 NOTE — PROGRESS NOTES
"Assessment/Plan   Diagnoses and all orders for this visit:  Regular astigmatism of both eyes  Hypermetropia of both eyes  New spec rx released today per patient request. Ocular health wnl for age OU. Monitor 1 year or sooner prn. Refraction billed today. Pt consents to receiving glasses Rx today. Patient's/guardian's signature obtained to acknowledge and confirm that a paper copy of glasses Rx was given to patient in compliance with [facility]eglass Rule. Electronic copy of Rx will also be available via [employer]. Discussed spec Rx is largely stable.   Recommend use of artificial tears bid OU. Discussed ATs work best when used consistently. Discussed brand options and preserved vs. PF. Avoid visine/cleareyes/\"get the red out\" drops. Coupon given.    "

## 2025-02-12 ENCOUNTER — APPOINTMENT (OUTPATIENT)
Dept: BEHAVIORAL HEALTH | Facility: CLINIC | Age: 25
End: 2025-02-12
Payer: MEDICAID

## 2025-02-12 DIAGNOSIS — F32.1 CURRENT MODERATE EPISODE OF MAJOR DEPRESSIVE DISORDER, UNSPECIFIED WHETHER RECURRENT (MULTI): ICD-10-CM

## 2025-02-12 DIAGNOSIS — F41.1 GAD (GENERALIZED ANXIETY DISORDER): ICD-10-CM

## 2025-02-12 DIAGNOSIS — F90.2 ATTENTION DEFICIT HYPERACTIVITY DISORDER (ADHD), COMBINED TYPE: ICD-10-CM

## 2025-02-12 DIAGNOSIS — F19.10 SUBSTANCE ABUSE (MULTI): ICD-10-CM

## 2025-02-12 PROCEDURE — 99214 OFFICE O/P EST MOD 30 MIN: CPT | Performed by: PSYCHIATRY & NEUROLOGY

## 2025-02-12 NOTE — PROGRESS NOTES
"Outpatient Psychiatry - Followup Medication Management     Location of service:  _X__ Office       ___ A/V            ___Telephone      Subjective   Luciano Tejeda, a 24 y.o. male, for followup visit.        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             Codes     Bipolar 2 disorder (CMS/HCC)     F31.81                  Treatment Goals:    Anxiety: acquiring relapse prevention skills, reducing negative automatic thoughts, and reducing physical symptoms of anxiety  Drug & Alcohol: reducing/eliminating substance use  Bipolar II:  Reducing;/eliminating mood swings, reducing/eliminating spending sprees, gambling, irritability        Treatment Plan/Recommendations:      Continue     Abilify (Aripiprazole) 5 mg (1 daily and 1 additional as needed)   Fluoxetine 20 mg daily   Followup with therapy ([facility])  RTC 1 month        Follow-up plan was discussed with patient.        Review with patient: Treatment plan reviewed with the patient.  Medication risks/benefit reviewed with the patient        HPI:    Patient returns for followup; Mother joins for first 5\" to tell me he is doing well, much as last tme, but she is concerned that he keeps dating people from work ([employer]) and thinks the latest girl is \"too young for him\" (19).  He says she is not a girlfriend, just a friend who came over one time.     Alone he again agrees Prozac is working well; feels calmer and \"more together;\" feels \"more in touch with my body,\" e.g. feels when he's tired and goes to sleep, feels more focused and organized.  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  For summer he expects " to take 2 courses and notes that while still on-line they require him to come to campus for exams.    Otherwise nothing new to report; acknowledges he has trouble getting started with things, and even if Mom helps to start he has trouble with follow through (such as completing his Resume) but that it isn't the medications that are going to make him do better, it has to come from him.    Claims significant reduction in drug and alcohol use but admits that he and mom split a 12-pack of beer that lasted less than 2 days.  Still occasionally uses cannabis vape, and still smoking cigarettes...     Current Medications:     Current Outpatient Medications:     aripiprazole 5 mg  + Fluoxetine 20 mg     (+certizine, fluticonasone, loratadine, pantoprazole, polymixin from others)     No side effects or other concerns reported    Interim Medical History and Review of Systems:  No changes     Record Review: moderate     Psychiatric Review Of Systems:   Depressive Symptoms: moderate  Manic Symptoms: Less irritable; sleep is usually okay; still smoking and vaping daily; not taking action towards future goals (e.g. going to veteranary school)  Anxiety Symptoms: Minimal  Disordered Eating Symptoms: NA  Inattentive Symptoms: improved with increased meds  Hyperactive/Impulsive Symptoms: See abpve  Trauma Symptoms: NA  Conduct Issues: NA  Psychotic Symptoms: NA  Developmental Concerns: NA  Other Symptoms/Concerns:NA  Delirium/Altered Mental Status Symptoms: NA        Objective   Mental Status Exam:  Patient is a well nourished, well developed young adult white male appearing to be approximately stated age  Appearance:   Well groomed, appropriately dressed   Attention:  Oriented X 3  Speech:  fluent, coherent, no evidence for formal thought disorder      Process:  abstract; occasional concrete associations       Content:  without hallucinations, delusions,  denies SI, HI  Movements: No apparent gait disturbance or abnormal involuntary  "movements of face or trunk  Mood:  \"pretty good\"  Affect:  pleasant and conversant, calm today when discussing conflicts with Mom  Cognition:   Grossly intact and appropriate to level of educational attainment  Judgement:   Fair  Insight:   Good         Vitals:  There were no vitals filed for this visit.     Summary of Psychotherapy Component:       Psychoeducation/Therapeutic Interventions during this visit:   __X_ Education re: symptoms, diagnosis and treatment options   __x_ Discussed physiologic factors that impact symptoms and stress   __X_ Discussed patterns of behavior and coping style that contribute to symptoms and presented alternative options   __X_ Identified cognitive distortions and presented alternative options   __X_ Reinforced boundaries of control and responsibility in symptom management   __X_ Education re: impact of alcohol/drugs on symptoms and discussed treatment resources/rationale   __X Explored, identified, reinforced strategies and resources for coping with symptoms    __X_ Discussed medication options, risks, benefits with patient and/or family/guardian      Psychotherapeutic approach:  __X_Individual                          __X_Supportive          :     Other relevant content:  (see HPI for relevant details)  "

## 2025-03-12 ENCOUNTER — APPOINTMENT (OUTPATIENT)
Dept: BEHAVIORAL HEALTH | Facility: CLINIC | Age: 25
End: 2025-03-12
Payer: MEDICAID

## 2025-03-12 DIAGNOSIS — F90.2 ATTENTION DEFICIT HYPERACTIVITY DISORDER (ADHD), COMBINED TYPE: ICD-10-CM

## 2025-03-12 DIAGNOSIS — F32.1 CURRENT MODERATE EPISODE OF MAJOR DEPRESSIVE DISORDER, UNSPECIFIED WHETHER RECURRENT (MULTI): ICD-10-CM

## 2025-03-12 DIAGNOSIS — F19.10 SUBSTANCE ABUSE (MULTI): ICD-10-CM

## 2025-03-12 DIAGNOSIS — F31.81 BIPOLAR 2 DISORDER (MULTI): ICD-10-CM

## 2025-03-12 PROCEDURE — 99214 OFFICE O/P EST MOD 30 MIN: CPT | Performed by: PSYCHIATRY & NEUROLOGY

## 2025-03-12 RX ORDER — ARIPIPRAZOLE 5 MG/1
5 TABLET ORAL DAILY
Qty: 30 TABLET | Refills: 2 | Status: SHIPPED | OUTPATIENT
Start: 2025-03-12 | End: 2025-06-10

## 2025-03-12 RX ORDER — FLUOXETINE HYDROCHLORIDE 20 MG/1
40 CAPSULE ORAL DAILY
Qty: 60 CAPSULE | Refills: 2 | Status: SHIPPED | OUTPATIENT
Start: 2025-03-12 | End: 2025-06-10

## 2025-03-12 NOTE — PROGRESS NOTES
"Outpatient Psychiatry - Followup Medication Management     Location of service:  _X__ Office       ___ A/V            ___Telephone      Subjective   Luciano Tejeda, a 24 y.o. male, for followup visit.        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  Increase Fluoxetine to 40 mg daily   Followup with therapy ([facility])  RTC 1 month        Follow-up plan was discussed with patient.     Review with patient: Treatment plan reviewed with the patient.  Medication risks/benefit reviewed with the patient        HPI:    Patient returns for followup; Mother joins for first 5\" to tell me he is not doing as well; not showering (?), hasn't completed paperwork for fall semester at school, thinks he's not motivated.     Alone he expresses frustration with Mom as usual; says he is attending his classes, took an exam and only got 50% but thinks it is because grading setup didn't recognize most of his essay answers (same issue for others) so teacher is going to regrade.  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       Otherwise nothing new to report; agrees increase in Prozac might be helpful     Still vaping...     Current Medications:     Current Outpatient Medications:     aripiprazole 5 mg  + Fluoxetine 40 " "mg     (+certizine, fluticonasone, loratadine, pantoprazole, polymixin from others)     No side effects or other concerns reported     Interim Medical History and Review of Systems:  No changes     Record Review: moderate     Psychiatric Review Of Systems:   Depressive Symptoms: moderate  Manic Symptoms: Less irritable; sleep is usually okay; still smoking and vaping daily; not taking action towards future goals (e.g. going to veteranary school)  Anxiety Symptoms: Minimal  Disordered Eating Symptoms: NA  Inattentive Symptoms: improved with increased meds  Hyperactive/Impulsive Symptoms: See abpve  Trauma Symptoms: NA  Conduct Issues: NA  Psychotic Symptoms: NA  Developmental Concerns: NA  Other Symptoms/Concerns:NA  Delirium/Altered Mental Status Symptoms: NA        Objective   Mental Status Exam:  Patient is a well nourished, well developed young adult white male appearing to be approximately stated age  Appearance:   Well groomed, appropriately dressed   Attention:  Oriented X 3  Speech:  fluent, coherent, no evidence for formal thought disorder      Process:  abstract; occasional concrete associations       Content:  without hallucinations, delusions,  denies SI, HI  Movements: No apparent gait disturbance or abnormal involuntary movements of face or trunk  Mood:  \"pretty good\"  Affect:  pleasant and conversant, calm today when discussing conflicts with Mom  Cognition:   Grossly intact and appropriate to level of educational attainment  Judgement:   Fair  Insight:   Good         Vitals:  There were no vitals filed for this visit.     Summary of Psychotherapy Component:       Psychoeducation/Therapeutic Interventions during this visit:   __X_ Education re: symptoms, diagnosis and treatment options   __x_ Discussed physiologic factors that impact symptoms and stress   __X_ Discussed patterns of behavior and coping style that contribute to symptoms   __X_ Identified cognitive distortions and presented alternative " options   __X_ Reinforced boundaries of control and responsibility in symptom management   __X_ Education re: impact of alcohol/drugs on symptoms and discussed treatment resources/rationale   __X Explored, identified, reinforced strategies and resources for coping with symptoms    __X_ Discussed medication options, risks, benefits with patient and/or family/guardian      Psychotherapeutic approach:  __X_Individual                          __X_Supportive          :     Other relevant content:  (see HPI for relevant details)

## 2025-04-09 ENCOUNTER — APPOINTMENT (OUTPATIENT)
Dept: BEHAVIORAL HEALTH | Facility: CLINIC | Age: 25
End: 2025-04-09
Payer: MEDICAID

## 2025-04-09 DIAGNOSIS — F19.10 SUBSTANCE ABUSE: ICD-10-CM

## 2025-04-09 DIAGNOSIS — F31.81 BIPOLAR 2 DISORDER (MULTI): ICD-10-CM

## 2025-04-09 DIAGNOSIS — F90.2 ATTENTION DEFICIT HYPERACTIVITY DISORDER (ADHD), COMBINED TYPE: ICD-10-CM

## 2025-04-09 DIAGNOSIS — F41.1 GAD (GENERALIZED ANXIETY DISORDER): ICD-10-CM

## 2025-04-09 PROCEDURE — 99214 OFFICE O/P EST MOD 30 MIN: CPT | Performed by: PSYCHIATRY & NEUROLOGY

## 2025-04-09 NOTE — PROGRESS NOTES
"Outpatient Psychiatry - Followup Medication Management     Location of service:  _X__ Office       ___ A/V            ___Telephone      Subjective   Luciano Tejeda, a 25 y.o. male, for followup visit.        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  Continue Fluoxetine to 40 mg daily   3.   Followup with therapy ([facility])  4.   RTC 1 month        Follow-up plan was discussed with patient.      Review with patient: Treatment plan reviewed with the patient.  Medication risks/benefit reviewed with the patient        HPI:    Patient returns for followup; Mom doesn't give me a pre-report today, tells me they are getting something delivered she needs to be home for.     Tells me he finished semester and still waiting for grades; then that he got an email that he has to come complete some paperwork for his scholarship funds.  Later says he tried to go to class and they \"kicked me out;\" was not listed as eligible for class, unsure why:  He says he's done all the assignments and exams, got a 92% on the final, but isn't sure he'll get credit:  Plans to go in to discuss this Friday.  Still working at [employer], now 3 days from 12 to 8.   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  Also still has not moved out of the house as he is financially " "dependent on Mom.     Otherwise nothing new to report; feels a little drowsy in daytime and less interest in shira; says \"I feel like the dopamine is gone...\"  Asked to clarify says he doesn't feel driven to shira as he was before but seems unhappy about this.  Denies alcohol, then clarifies \"a 12 pack lasts me maybe a week and a half, if my Mom isn't drinking them...\"   Denies cigarettes or marijuana but then admits he is still vaping.     On way out Mom says he needs to see his PCP because he is \"coughing all the time like he's been smoking 5 packs a day.\"    Current Medications:     Current Outpatient Medications:     aripiprazole 5 mg  + Fluoxetine 40 mg     (+certizine, fluticonasone, loratadine, pantoprazole, polymixin from others)     No side effects or other concerns reported     Interim Medical History and Review of Systems:  No changes     Record Review: moderate     Psychiatric Review Of Systems:   Depressive Symptoms: moderate  Manic Symptoms: Less irritable; sleep is usually okay; still smoking and vaping daily; not taking action towards future goals (e.g. going to veteranary school)  Anxiety Symptoms: Minimal  Disordered Eating Symptoms: NA  Inattentive Symptoms: improved with increased meds  Hyperactive/Impulsive Symptoms: See abpve  Trauma Symptoms: NA  Conduct Issues: NA  Psychotic Symptoms: NA  Developmental Concerns: NA  Other Symptoms/Concerns:NA  Delirium/Altered Mental Status Symptoms: NA        Objective   Mental Status Exam:  Patient is a well nourished, well developed young adult white male appearing to be approximately stated age  Appearance:   Well groomed, appropriately dressed   Attention:  Oriented X 3  Speech:  fluent, coherent, no evidence for formal thought disorder      Process:  abstract; occasional concrete associations       Content:  without hallucinations, delusions,  denies SI, HI  Movements: No apparent gait disturbance or abnormal involuntary movements of face or " "trunk  Mood:  \"pretty good\"  Affect:  pleasant and conversant, calm today when discussing conflicts with Mom  Cognition:   Grossly intact and appropriate to level of educational attainment  Judgement:   Fair  Insight:   Good         Vitals:  There were no vitals filed for this visit.     Summary of Psychotherapy Component:       Psychoeducation/Therapeutic Interventions during this visit:   __X_ Education re: symptoms, diagnosis and treatment options   __x_ Discussed physiologic factors that impact symptoms and stress   __X_ Discussed patterns of behavior and coping style that contribute to symptoms   __X_ Identified cognitive distortions and presented alternative options   __X_ Reinforced boundaries of control and responsibility in symptom management   __X_ Education re: impact of alcohol/drugs on symptoms and discussed treatment resources/rationale   __X Explored, identified, reinforced strategies and resources for coping with symptoms    __X_ Discussed medication options, risks, benefits with patient and/or family/guardian      Psychotherapeutic approach:  __X_Individual                          __X_Supportive          :     Other relevant content:  (see HPI for relevant details)     No vital signs obtained for this visit  "

## 2025-05-07 ENCOUNTER — APPOINTMENT (OUTPATIENT)
Dept: BEHAVIORAL HEALTH | Facility: CLINIC | Age: 25
End: 2025-05-07
Payer: MEDICAID

## 2025-05-07 DIAGNOSIS — F31.81 BIPOLAR 2 DISORDER (MULTI): ICD-10-CM

## 2025-05-07 DIAGNOSIS — F19.10 SUBSTANCE ABUSE: ICD-10-CM

## 2025-05-07 DIAGNOSIS — F41.1 GAD (GENERALIZED ANXIETY DISORDER): ICD-10-CM

## 2025-05-07 PROCEDURE — 99214 OFFICE O/P EST MOD 30 MIN: CPT | Performed by: PSYCHIATRY & NEUROLOGY

## 2025-05-07 NOTE — PROGRESS NOTES
"Outpatient Psychiatry - Followup Medication Management     Location of service:  _X__ Office       ___ A/V            ___Telephone      Subjective   Luciano Tejeda, a 25 y.o. male, for followup visit.        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   Reduce Fluoxetine to 20 mg daily   3.   Obtain Genes[employer] testing  4.   Attempt to scheduled fMRI (at mother's insistence)  5.   Follow up with [facility]        Follow-up plan was discussed with patient.      Review with patient: Treatment plan reviewed with the patient.  Medication risks/benefit reviewed with the patient        HPI:    Patient returns for followup; Mom insists on meeting for 5 minutes in advance, reports he has been \"terrible,\" he got \"manic\" from the increase to 40 mg Prozac so she cut it back to 20; also \"increased\" aripiprazole from 2.5 to 5 mg.  I responded that the increase in Prozac was 2 months ago, and the instruction was not to reduce aripiprazole to 2.5, it was to INCREASE aripiprazole to 7.5 from 5 if needed.  Nevertheless Mom says \"it's time to stop messing around, we need to find out what's causing this now - he needs an fMRI\" (which she has requested since initial appointment).  I let her know that insurance may not cover, as this is not typically used to distinguish between Bipolar and Schizoaffective disorders, but she insists she has read about it and seen on TV with Dr. Clarke that \"modern " "psychiatrists\" are using fMRI to make diagnoses and learning they have been treating patients inappropriately.\"   Explained if he has the exam and insurance denies there will be a substantial bill...    Feliciano says his Mother is \"crazy\" and \"she's the one who needs the fMRI;\" claims she is paranoid, thinks people are watching the house, comes downstairs and night and flicks lights on and off to \"signal\" whoever is watching; does not believe him when he says he's taken meds, and is impossible to live with - but can't afford anything else.  Has met with school counselor and revising studies to getting an Associates Degree that would allow him to work as a  \"first,\" then if he decides to pursue [employer] school he can do so later; he would also be able to afford to move out and live his own life.    Agrees in meantime to continue Prozac 20 mg and Abilify 5 mg, will do cheek swab for Genesight when kit is delivered, will have fMIR if    Current Medications:     Current Outpatient Medications:     aripiprazole 5 mg  + Fluoxetine 40 mg     (+certizine, fluticonasone, loratadine, pantoprazole, polymixin from others)     No side effects or other concerns reported     Interim Medical History and Review of Systems:  No changes     Record Review: moderate     Psychiatric Review Of Systems:   Depressive Symptoms: moderate  Manic Symptoms: Less irritable; sleep is usually okay; still smoking and vaping daily; not taking action towards future goals (e.g. going to veteranary school)  Anxiety Symptoms: Minimal  Disordered Eating Symptoms: NA  Inattentive Symptoms: improved with increased meds  Hyperactive/Impulsive Symptoms: See abpve  Trauma Symptoms: NA  Conduct Issues: NA  Psychotic Symptoms: NA  Developmental Concerns: NA  Other Symptoms/Concerns:NA  Delirium/Altered Mental Status Symptoms: NA        Objective   Mental Status Exam:  Patient is a well nourished, well developed young adult white male appearing to be " "approximately stated age  Appearance:   Well groomed, appropriately dressed   Attention:  Oriented X 3  Speech:  fluent, coherent, no evidence for formal thought disorder      Process:  abstract; occasional concrete associations       Content:  without hallucinations, delusions,  denies SI, HI  Movements: No apparent gait disturbance or abnormal involuntary movements of face or trunk  Mood:  \"pretty good\"  Affect:  pleasant and conversant, calm today when discussing conflicts with Mom  Cognition:   Grossly intact and appropriate to level of educational attainment  Judgement:   Fair  Insight:   Good         Vitals:  There were no vitals filed for this visit.     Summary of Psychotherapy Component:       Psychoeducation/Therapeutic Interventions during this visit:   __X_ Education re: symptoms, diagnosis and treatment options   __x_ Discussed physiologic factors that impact symptoms and stress   __X_ Discussed patterns of behavior and coping style that contribute to symptoms   __X_ Identified cognitive distortions and presented alternative options   __X_ Reinforced boundaries of control and responsibility in symptom management   __X_ Education re: impact of alcohol/drugs on symptoms and discussed treatment resources/rationale   __X Explored, identified, reinforced strategies and resources for coping with symptoms    __X_ Discussed medication options, risks, benefits with patient and/or family/guardian      Psychotherapeutic approach:  __X_Individual                          __X_Supportive          :     Other relevant content:  (see HPI for relevant details)                 No vital signs obtained for this visit  "

## 2025-05-12 DIAGNOSIS — K21.9 GASTROESOPHAGEAL REFLUX DISEASE, UNSPECIFIED WHETHER ESOPHAGITIS PRESENT: ICD-10-CM

## 2025-05-12 RX ORDER — PANTOPRAZOLE SODIUM 40 MG/1
40 TABLET, DELAYED RELEASE ORAL
Qty: 90 TABLET | Refills: 1 | Status: SHIPPED | OUTPATIENT
Start: 2025-05-12

## 2025-06-11 ENCOUNTER — APPOINTMENT (OUTPATIENT)
Dept: BEHAVIORAL HEALTH | Facility: CLINIC | Age: 25
End: 2025-06-11
Payer: MEDICAID

## 2025-06-11 DIAGNOSIS — F90.2 ATTENTION DEFICIT HYPERACTIVITY DISORDER (ADHD), COMBINED TYPE: ICD-10-CM

## 2025-06-11 DIAGNOSIS — F41.1 GAD (GENERALIZED ANXIETY DISORDER): ICD-10-CM

## 2025-06-11 DIAGNOSIS — F19.10 SUBSTANCE ABUSE: ICD-10-CM

## 2025-06-11 DIAGNOSIS — F31.81 BIPOLAR 2 DISORDER (MULTI): Primary | ICD-10-CM

## 2025-06-11 PROCEDURE — 99213 OFFICE O/P EST LOW 20 MIN: CPT | Performed by: PSYCHIATRY & NEUROLOGY

## 2025-06-11 RX ORDER — DIVALPROEX SODIUM 250 MG/1
250 TABLET, DELAYED RELEASE ORAL 2 TIMES DAILY
Qty: 60 TABLET | Refills: 2 | Status: SHIPPED | OUTPATIENT
Start: 2025-06-11 | End: 2025-09-09

## 2025-06-11 NOTE — PROGRESS NOTES
"Outpatient Psychiatry - Followup Medication Management     Location of service:  ___ Office       ___ A/V            __X_Telephone      Subjective   Luciano Tejeda, a 25 y.o. male, for followup visit.        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   Fluoxetine to 20 mg daily   3.   Add Depakote (divalproex) 250 mg daily for 1 week, then twice daily  4.   Follow up with Arsen        Follow-up plan was discussed with patient.      Review with patient: Treatment plan reviewed with the patient.  Medication risks/benefit reviewed with the patient        HPI:    Had another blowup with his mother and refused to get in car with her; says she continues to be erratic and verbally abusive, one moment everything is fine and the next she is telling him he is worthless and accuses him of smoking weed when he is smoking a cigarette.  Called to ask for virtual appointment today but too late to get videolink, so spoke by phone.  Agrees only answer for him is to move out of the house but can't afford to.    Reports \"like a roller coaster;\"  some days mood is good, some days can't sleep and thoughts racing, other days depressed and can't get out of bed.  Reviewed mood stabilizers we've tried in past; he says they stopped Depakote because vision was blurry, but in hindsight thinks it was that he needed new glasses.  Is willing to try again; I suggested just 250 mg a day and increase " "after a week if no difficulties.    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     Plans to register for 6 in fall and understands he can't drop any classes (he dropped chemistry which would have been the other 3 credits he needed last semester)    Current Medications:     Current Outpatient Medications:     aripiprazole 5 mg  + Fluoxetine 40 mg     (+certizine, fluticonasone, loratadine, pantoprazole, polymixin from others)     No side effects or other concerns reported     Interim Medical History and Review of Systems:  No changes     Record Review: moderate     Psychiatric Review Of Systems:   Depressive Symptoms: intermittent  Manic Symptoms: Intermittently sleepless and \"hyper\"  Anxiety Symptoms: Minimal  Disordered Eating Symptoms: NA  Inattentive Symptoms: improved with increased meds  Hyperactive/Impulsive Symptoms: See abpve  Trauma Symptoms: NA  Conduct Issues: NA  Psychotic Symptoms: NA  Developmental Concerns: NA  Other Symptoms/Concerns:NA  Delirium/Altered Mental Status Symptoms: NA        Objective   Mental Status Exam:    Appearance:  (unable to assess by phone)  Attention:  Oriented X 3  Speech:  fluent, coherent, no evidence for formal thought disorder      Process:  abstract; occasional concrete associations       Content:  without hallucinations, delusions,  denies SI, HI  Movements: No apparent gait disturbance or abnormal involuntary movements of face or trunk  Mood:  \"pretty good\"  Affect:  pleasant and conversant, calm today when discussing conflicts with Mom  Cognition:   Grossly intact and appropriate to level of educational attainment  Judgement:   Fair  Insight:   Good         Vitals:  There were no vitals filed for this visit.     Summary of Psychotherapy Component:       Psychoeducation/Therapeutic " Interventions during this visit:   __X_ Education re: symptoms, diagnosis and treatment options   __x_ Discussed physiologic factors that impact symptoms and stress   __X_ Discussed patterns of behavior and coping style that contribute to symptoms   __X_ Identified cognitive distortions and presented alternative options   __X_ Reinforced boundaries of control and responsibility in symptom management   __X_ Education re: impact of alcohol/drugs on symptoms and discussed treatment resources/rationale   __X Explored, identified, reinforced strategies and resources for coping with symptoms    __X_ Discussed medication options, risks, benefits with patient and/or family/guardian      Psychotherapeutic approach:  __X_Individual                          __X_Supportive          :     Other relevant content:  (see HPI for relevant details)                 No vital signs obtained for this visit

## 2025-07-09 ENCOUNTER — APPOINTMENT (OUTPATIENT)
Dept: BEHAVIORAL HEALTH | Facility: CLINIC | Age: 25
End: 2025-07-09
Payer: MEDICAID

## 2025-07-30 ENCOUNTER — APPOINTMENT (OUTPATIENT)
Dept: BEHAVIORAL HEALTH | Facility: CLINIC | Age: 25
End: 2025-07-30
Payer: MEDICAID

## 2025-07-30 DIAGNOSIS — F32.1 CURRENT MODERATE EPISODE OF MAJOR DEPRESSIVE DISORDER, UNSPECIFIED WHETHER RECURRENT (MULTI): ICD-10-CM

## 2025-07-30 DIAGNOSIS — F31.81 BIPOLAR 2 DISORDER (MULTI): ICD-10-CM

## 2025-07-30 DIAGNOSIS — F41.1 GAD (GENERALIZED ANXIETY DISORDER): ICD-10-CM

## 2025-07-30 DIAGNOSIS — F19.10 SUBSTANCE ABUSE: ICD-10-CM

## 2025-07-30 DIAGNOSIS — F90.2 ATTENTION DEFICIT HYPERACTIVITY DISORDER (ADHD), COMBINED TYPE: ICD-10-CM

## 2025-07-30 PROCEDURE — 99214 OFFICE O/P EST MOD 30 MIN: CPT | Performed by: PSYCHIATRY & NEUROLOGY

## 2025-07-30 RX ORDER — ARIPIPRAZOLE 5 MG/1
5 TABLET ORAL DAILY
Qty: 30 TABLET | Refills: 2 | Status: SHIPPED | OUTPATIENT
Start: 2025-07-30 | End: 2025-10-28

## 2025-07-30 RX ORDER — FLUOXETINE 20 MG/1
40 CAPSULE ORAL DAILY
Qty: 60 CAPSULE | Refills: 0 | Status: SHIPPED | OUTPATIENT
Start: 2025-07-30

## 2025-07-30 NOTE — PROGRESS NOTES
"Outpatient Psychiatry - Followup Medication Management     Location of service:  ___ Office       ___ A/V            __X_Telephone      Subjective   Luciano Tejeda, a 25 y.o. male, for followup visit.        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   Fluoxetine to 20 mg daily   3.   Add Depakote (divalproex) 250 mg daily for 1 week, then twice daily  4.   Follow up with Arsen        Follow-up plan was discussed with patient.      Review with patient: Treatment plan reviewed with the patient.  Medication risks/benefit reviewed with the patient        HPI:    Reports things are \"okay\" at home; Mom still \"drives me crazy\" but no overt blowups.  Feels Depakote has helped with mood stability; also finds he is getting tired by 11 or 12 at night so plans to shift work schedule back to days.  Not in class this semester; because he dropped one of two classes in summer his financial aid was canceled; he was unaware and was sent to Collections, so has to repay for the one class he took before he can re-enroll; some snafu between billing office and , went to 's office for help; deadline extended to August.  Feels with the extra work day he's picked up he'll be able to pay it off and re-enroll for winter semester.  Otherwise about the same; no particular concerns to discuss today.     Current Medications:     Current Outpatient Medications:     aripiprazole 5 " "mg  + Fluoxetine 20 mg  + Depakote 250 twice daily    (Plus Certizine antihistamine and Protonix from PCP)     (+certizine, fluticonasone, loratadine, pantoprazole, polymixin from others)     No side effects or other concerns reported     Interim Medical History and Review of Systems:  No changes     Record Review: moderate     Psychiatric Review Of Systems:   Depressive Symptoms: intermittent  Manic Symptoms: not reported  Anxiety Symptoms: Minimal  Disordered Eating Symptoms: NA  Inattentive Symptoms: improved with increased meds  Hyperactive/Impulsive Symptoms: See abpve  Trauma Symptoms: NA  Conduct Issues: NA  Psychotic Symptoms: NA  Developmental Concerns: NA  Other Symptoms/Concerns:NA  Delirium/Altered Mental Status Symptoms: NA        Objective   Mental Status Exam:     Appearance:  (unable to assess by phone)  Attention:  Oriented X 3  Speech:  fluent, coherent, no evidence for formal thought disorder      Process:  abstract; occasional concrete associations       Content:  without hallucinations, delusions,  denies SI, HI  Movements: No apparent abnormal involuntary movements of face; unable to assess gait or general body habitus  Mood:  \"pretty good\"  Affect:  pleasant and conversant, calm today when discussing conflicts with Mom  Cognition:   Grossly intact and appropriate to level of educational attainment  Judgement:   Fair  Insight:   Good         Vitals:  There were no vitals filed for this visit.     Summary of Psychotherapy Component:       Psychoeducation/Therapeutic Interventions during this visit:   __X_ Education re: symptoms, diagnosis and treatment options   __x_ Discussed physiologic factors that impact symptoms and stress   __X_ Discussed patterns of behavior and coping style that contribute to symptoms   __X_ Identified cognitive distortions and presented alternative options   __X_ Reinforced boundaries of control and responsibility in symptom management   __X_ Education re: impact of " alcohol/drugs on symptoms and discussed treatment resources/rationale   __X Explored, identified, reinforced strategies and resources for coping with symptoms    __X_ Discussed medication options, risks, benefits with patient and/or family/guardian      Psychotherapeutic approach:  __X_Individual                          __X_Supportive          :     Other relevant content:  (see HPI for relevant details)                 No vital signs obtained for this visi

## 2025-08-02 DIAGNOSIS — Z00.00 HEALTHCARE MAINTENANCE: ICD-10-CM

## 2025-08-04 RX ORDER — CETIRIZINE HYDROCHLORIDE 10 MG/1
10 TABLET ORAL DAILY
Qty: 90 TABLET | Refills: 1 | Status: SHIPPED | OUTPATIENT
Start: 2025-08-04

## 2025-08-24 DIAGNOSIS — F31.81 BIPOLAR 2 DISORDER (MULTI): ICD-10-CM

## 2025-08-29 RX ORDER — ARIPIPRAZOLE 5 MG/1
5 TABLET ORAL DAILY
Qty: 90 TABLET | Refills: 1 | Status: SHIPPED | OUTPATIENT
Start: 2025-08-29

## 2025-09-03 ENCOUNTER — APPOINTMENT (OUTPATIENT)
Dept: BEHAVIORAL HEALTH | Facility: CLINIC | Age: 25
End: 2025-09-03
Payer: MEDICAID

## 2025-10-01 ENCOUNTER — APPOINTMENT (OUTPATIENT)
Dept: BEHAVIORAL HEALTH | Facility: CLINIC | Age: 25
End: 2025-10-01
Payer: MEDICAID

## 2025-11-11 ENCOUNTER — APPOINTMENT (OUTPATIENT)
Dept: PRIMARY CARE | Facility: CLINIC | Age: 25
End: 2025-11-11
Payer: MEDICAID

## 2025-12-10 ENCOUNTER — APPOINTMENT (OUTPATIENT)
Dept: BEHAVIORAL HEALTH | Facility: CLINIC | Age: 25
End: 2025-12-10
Payer: MEDICAID

## 2026-02-27 ENCOUNTER — APPOINTMENT (OUTPATIENT)
Dept: OPHTHALMOLOGY | Facility: CLINIC | Age: 26
End: 2026-02-27
Payer: MEDICAID

## (undated) DEVICE — CLIP, LIGATING, HORIZON, MEDIUM, TITANIUM

## (undated) DEVICE — SPONGE, HEMOSTATIC, CELLULOSE, SURGICEL, FIBRILLAR, 2 X 4 IN

## (undated) DEVICE — MANIFOLD, 4 PORT NEPTUNE STANDARD

## (undated) DEVICE — COVER, TRANSDUCER, NEO GUARD, 2.6 X 30CM, LF

## (undated) DEVICE — SYRINGE, HYPODERMIC, TB, W/O NEEDLE, 1 CC, SLIP TIP

## (undated) DEVICE — Device

## (undated) DEVICE — SYRINGE, MONOJECT, LUER LOCK, 3 CC, LF

## (undated) DEVICE — CLIP, LIGATING, HORIZON, WIDE SLOT, SMALL, TITANIUM

## (undated) DEVICE — STAY SET, SURGICAL, 5MM SHARP HOOK, 8PK

## (undated) DEVICE — BOWL, BASIN, 32 OZ, STERILE

## (undated) DEVICE — COVER, CART, 45 X 27 X 48 IN, CLEAR

## (undated) DEVICE — REST, HEAD, BAGEL, 9 IN